# Patient Record
Sex: MALE | Race: WHITE | NOT HISPANIC OR LATINO | Employment: FULL TIME | URBAN - METROPOLITAN AREA
[De-identification: names, ages, dates, MRNs, and addresses within clinical notes are randomized per-mention and may not be internally consistent; named-entity substitution may affect disease eponyms.]

---

## 2020-12-24 PROCEDURE — U0003 INFECTIOUS AGENT DETECTION BY NUCLEIC ACID (DNA OR RNA); SEVERE ACUTE RESPIRATORY SYNDROME CORONAVIRUS 2 (SARS-COV-2) (CORONAVIRUS DISEASE [COVID-19]), AMPLIFIED PROBE TECHNIQUE, MAKING USE OF HIGH THROUGHPUT TECHNOLOGIES AS DESCRIBED BY CMS-2020-01-R: HCPCS | Performed by: FAMILY MEDICINE

## 2021-06-02 PROCEDURE — U0003 INFECTIOUS AGENT DETECTION BY NUCLEIC ACID (DNA OR RNA); SEVERE ACUTE RESPIRATORY SYNDROME CORONAVIRUS 2 (SARS-COV-2) (CORONAVIRUS DISEASE [COVID-19]), AMPLIFIED PROBE TECHNIQUE, MAKING USE OF HIGH THROUGHPUT TECHNOLOGIES AS DESCRIBED BY CMS-2020-01-R: HCPCS

## 2023-05-08 ENCOUNTER — HOSPITAL ENCOUNTER (EMERGENCY)
Facility: HOSPITAL | Age: 33
Discharge: HOME | End: 2023-05-08
Attending: EMERGENCY MEDICINE | Admitting: EMERGENCY MEDICINE
Payer: COMMERCIAL

## 2023-05-08 ENCOUNTER — APPOINTMENT (OUTPATIENT)
Dept: CT IMAGING | Facility: HOSPITAL | Age: 33
End: 2023-05-08
Payer: COMMERCIAL

## 2023-05-08 VITALS
HEIGHT: 70 IN | BODY MASS INDEX: 26.54 KG/M2 | HEART RATE: 67 BPM | SYSTOLIC BLOOD PRESSURE: 108 MMHG | DIASTOLIC BLOOD PRESSURE: 72 MMHG | OXYGEN SATURATION: 96 % | RESPIRATION RATE: 20 BRPM | TEMPERATURE: 98.4 F | WEIGHT: 185.41 LBS

## 2023-05-08 DIAGNOSIS — R74.8 ELEVATED LIVER ENZYMES: ICD-10-CM

## 2023-05-08 DIAGNOSIS — R11.2 NAUSEA AND VOMITING, UNSPECIFIED VOMITING TYPE: ICD-10-CM

## 2023-05-08 DIAGNOSIS — R10.84 GENERALIZED ABDOMINAL PAIN: Primary | ICD-10-CM

## 2023-05-08 LAB
ALBUMIN SERPL-MCNC: 4.6 G/DL (ref 3.5–5.2)
ALBUMIN/GLOB SERPL: 1.9 G/DL
ALP SERPL-CCNC: 112 U/L (ref 39–117)
ALT SERPL W P-5'-P-CCNC: 253 U/L (ref 1–41)
AMPHET+METHAMPHET UR QL: NEGATIVE
ANION GAP SERPL CALCULATED.3IONS-SCNC: 11 MMOL/L (ref 5–15)
AST SERPL-CCNC: 147 U/L (ref 1–40)
BACTERIA UR QL AUTO: ABNORMAL /HPF
BARBITURATES UR QL SCN: NEGATIVE
BASOPHILS # BLD AUTO: 0 10*3/MM3 (ref 0–0.2)
BASOPHILS NFR BLD AUTO: 0.2 % (ref 0–1.5)
BENZODIAZ UR QL SCN: NEGATIVE
BILIRUB SERPL-MCNC: 0.9 MG/DL (ref 0–1.2)
BILIRUB UR QL STRIP: NEGATIVE
BUN SERPL-MCNC: 14 MG/DL (ref 6–20)
BUN/CREAT SERPL: 15.9 (ref 7–25)
CALCIUM SPEC-SCNC: 9.5 MG/DL (ref 8.6–10.5)
CANNABINOIDS SERPL QL: NEGATIVE
CHLORIDE SERPL-SCNC: 101 MMOL/L (ref 98–107)
CLARITY UR: CLEAR
CO2 SERPL-SCNC: 28 MMOL/L (ref 22–29)
COCAINE UR QL: NEGATIVE
COLOR UR: YELLOW
CREAT SERPL-MCNC: 0.88 MG/DL (ref 0.76–1.27)
DEPRECATED RDW RBC AUTO: 45.9 FL (ref 37–54)
EGFRCR SERPLBLD CKD-EPI 2021: 117.9 ML/MIN/1.73
EOSINOPHIL # BLD AUTO: 0.1 10*3/MM3 (ref 0–0.4)
EOSINOPHIL NFR BLD AUTO: 1.4 % (ref 0.3–6.2)
ERYTHROCYTE [DISTWIDTH] IN BLOOD BY AUTOMATED COUNT: 13.8 % (ref 12.3–15.4)
GLOBULIN UR ELPH-MCNC: 2.4 GM/DL
GLUCOSE SERPL-MCNC: 94 MG/DL (ref 65–99)
GLUCOSE UR STRIP-MCNC: NEGATIVE MG/DL
HAV IGM SERPL QL IA: ABNORMAL
HBV CORE IGM SERPL QL IA: ABNORMAL
HBV SURFACE AG SERPL QL IA: ABNORMAL
HCT VFR BLD AUTO: 47.8 % (ref 37.5–51)
HCV AB SER DONR QL: REACTIVE
HGB BLD-MCNC: 16.3 G/DL (ref 13–17.7)
HGB UR QL STRIP.AUTO: NEGATIVE
HYALINE CASTS UR QL AUTO: ABNORMAL /LPF
KETONES UR QL STRIP: ABNORMAL
LEUKOCYTE ESTERASE UR QL STRIP.AUTO: ABNORMAL
LIPASE SERPL-CCNC: 30 U/L (ref 13–60)
LYMPHOCYTES # BLD AUTO: 1.6 10*3/MM3 (ref 0.7–3.1)
LYMPHOCYTES NFR BLD AUTO: 25.9 % (ref 19.6–45.3)
MCH RBC QN AUTO: 33.1 PG (ref 26.6–33)
MCHC RBC AUTO-ENTMCNC: 34.1 G/DL (ref 31.5–35.7)
MCV RBC AUTO: 97 FL (ref 79–97)
METHADONE UR QL SCN: NEGATIVE
MONOCYTES # BLD AUTO: 0.6 10*3/MM3 (ref 0.1–0.9)
MONOCYTES NFR BLD AUTO: 9.1 % (ref 5–12)
NEUTROPHILS NFR BLD AUTO: 3.9 10*3/MM3 (ref 1.7–7)
NEUTROPHILS NFR BLD AUTO: 63.4 % (ref 42.7–76)
NITRITE UR QL STRIP: NEGATIVE
NRBC BLD AUTO-RTO: 0.1 /100 WBC (ref 0–0.2)
OPIATES UR QL: NEGATIVE
OXYCODONE UR QL SCN: NEGATIVE
PH UR STRIP.AUTO: 8 [PH] (ref 5–8)
PLATELET # BLD AUTO: 258 10*3/MM3 (ref 140–450)
PMV BLD AUTO: 6.8 FL (ref 6–12)
POTASSIUM SERPL-SCNC: 4.4 MMOL/L (ref 3.5–5.2)
PROT SERPL-MCNC: 7 G/DL (ref 6–8.5)
PROT UR QL STRIP: ABNORMAL
RBC # BLD AUTO: 4.93 10*6/MM3 (ref 4.14–5.8)
RBC # UR STRIP: ABNORMAL /HPF
REF LAB TEST METHOD: ABNORMAL
SODIUM SERPL-SCNC: 140 MMOL/L (ref 136–145)
SP GR UR STRIP: 1.02 (ref 1–1.03)
SQUAMOUS #/AREA URNS HPF: ABNORMAL /HPF
UNIDENT CRYS URNS QL MICRO: ABNORMAL /HPF
UROBILINOGEN UR QL STRIP: ABNORMAL
WBC # UR STRIP: ABNORMAL /HPF
WBC NRBC COR # BLD: 6.1 10*3/MM3 (ref 3.4–10.8)

## 2023-05-08 PROCEDURE — 80074 ACUTE HEPATITIS PANEL: CPT | Performed by: NURSE PRACTITIONER

## 2023-05-08 PROCEDURE — 80307 DRUG TEST PRSMV CHEM ANLYZR: CPT | Performed by: NURSE PRACTITIONER

## 2023-05-08 PROCEDURE — 83690 ASSAY OF LIPASE: CPT | Performed by: NURSE PRACTITIONER

## 2023-05-08 PROCEDURE — 85025 COMPLETE CBC W/AUTO DIFF WBC: CPT | Performed by: NURSE PRACTITIONER

## 2023-05-08 PROCEDURE — 80053 COMPREHEN METABOLIC PANEL: CPT | Performed by: NURSE PRACTITIONER

## 2023-05-08 PROCEDURE — 81001 URINALYSIS AUTO W/SCOPE: CPT | Performed by: NURSE PRACTITIONER

## 2023-05-08 PROCEDURE — 96374 THER/PROPH/DIAG INJ IV PUSH: CPT

## 2023-05-08 PROCEDURE — 99283 EMERGENCY DEPT VISIT LOW MDM: CPT

## 2023-05-08 PROCEDURE — 96375 TX/PRO/DX INJ NEW DRUG ADDON: CPT

## 2023-05-08 PROCEDURE — 74176 CT ABD & PELVIS W/O CONTRAST: CPT

## 2023-05-08 PROCEDURE — 25010000002 ONDANSETRON PER 1 MG: Performed by: NURSE PRACTITIONER

## 2023-05-08 RX ORDER — PANTOPRAZOLE SODIUM 40 MG/1
40 TABLET, DELAYED RELEASE ORAL DAILY
Qty: 15 TABLET | Refills: 0 | Status: SHIPPED | OUTPATIENT
Start: 2023-05-08

## 2023-05-08 RX ORDER — PANTOPRAZOLE SODIUM 40 MG/10ML
80 INJECTION, POWDER, LYOPHILIZED, FOR SOLUTION INTRAVENOUS ONCE
Status: COMPLETED | OUTPATIENT
Start: 2023-05-08 | End: 2023-05-08

## 2023-05-08 RX ORDER — ONDANSETRON 4 MG/1
4 TABLET, ORALLY DISINTEGRATING ORAL EVERY 8 HOURS PRN
Qty: 8 TABLET | Refills: 0 | Status: SHIPPED | OUTPATIENT
Start: 2023-05-08

## 2023-05-08 RX ORDER — SODIUM CHLORIDE 0.9 % (FLUSH) 0.9 %
10 SYRINGE (ML) INJECTION AS NEEDED
Status: DISCONTINUED | OUTPATIENT
Start: 2023-05-08 | End: 2023-05-08 | Stop reason: HOSPADM

## 2023-05-08 RX ORDER — ONDANSETRON 2 MG/ML
4 INJECTION INTRAMUSCULAR; INTRAVENOUS ONCE
Status: COMPLETED | OUTPATIENT
Start: 2023-05-08 | End: 2023-05-08

## 2023-05-08 RX ADMIN — PANTOPRAZOLE SODIUM 80 MG: 40 INJECTION, POWDER, LYOPHILIZED, FOR SOLUTION INTRAVENOUS at 14:55

## 2023-05-08 RX ADMIN — SODIUM CHLORIDE 1000 ML: 9 INJECTION, SOLUTION INTRAVENOUS at 11:13

## 2023-05-08 RX ADMIN — ONDANSETRON 4 MG: 2 INJECTION INTRAMUSCULAR; INTRAVENOUS at 11:13

## 2023-05-08 NOTE — DISCHARGE INSTRUCTIONS
Take medications as prescribed.  Drink plenty of fluids.  Close follow-up with gastroenterology.  Call them today or tomorrow for appointment.  Return for new or worsening symptoms.

## 2023-05-08 NOTE — ED PROVIDER NOTES
"Subjective   History of Present Illness  Patient is a 32-year-old white male with no significant medical history who presents with complaints of abdominal pain nausea vomiting.  He states he has had central abdominal discomfort daily for \"a while now.\"  He states at least for months maybe close to a year he has had this.  He states also intermittently he has nausea with vomiting.  He reports today he feels bad presents to the ED for evaluation.  He does admit to drinking approximately 1/5 of vodka daily, last drink was around midnight last night.  He denies any hematemesis.  He denies any diarrhea constipation black or bloody stools.  Denies change in urination.  He denies any illicit drug use currently but states that he did used to use IV drugs.  States he has been clean for the last year or 2.  He denies any known ill contacts or recent travel.        Review of Systems   Constitutional: Negative for chills and fever.   Respiratory: Negative for shortness of breath.    Cardiovascular: Negative for chest pain.   Gastrointestinal: Positive for abdominal pain, nausea and vomiting. Negative for blood in stool, constipation and diarrhea.   Genitourinary: Negative for difficulty urinating, dysuria, frequency and urgency.       No past medical history on file.    No Known Allergies    No past surgical history on file.    No family history on file.    Social History     Socioeconomic History   • Marital status:    Tobacco Use   • Smoking status: Every Day     Packs/day: 1.50     Years: 15.00     Pack years: 22.50     Types: Cigarettes   • Smokeless tobacco: Never   Vaping Use   • Vaping Use: Never used   Substance and Sexual Activity   • Alcohol use: Not Currently     Comment: SOCIALLY            Objective   Physical Exam  Vital signs and triage nurse note reviewed.  Constitutional: Awake, alert; well-developed and well-nourished. No acute distress is noted.  HEENT: Normocephalic, atraumatic; pupils are PERRL with " intact EOM; oropharynx is pink and moist without exudate or erythema.  No drooling or pooling of oral secretions.  Neck: Supple, full range of motion without pain; no cervical lymphadenopathy. Normal phonation.  Cardiovascular: Regular rate and rhythm, normal S1-S2.  No murmur noted.  Pulmonary: Respiratory effort regular nonlabored, breath sounds clear to auscultation all fields.  Abdomen: Soft, nontender, nondistended with normoactive bowel sounds; no rebound or guarding.  Musculoskeletal: Independent range of motion of all extremities with no palpable tenderness or edema.  Neuro: Alert oriented x3, speech is clear and appropriate, GCS 15.    Skin: Flesh tone, warm, dry, intact; no erythematous or petechial rash or lesion.      Procedures           ED Course      Labs Reviewed   COMPREHENSIVE METABOLIC PANEL - Abnormal; Notable for the following components:       Result Value    ALT (SGPT) 253 (*)     AST (SGOT) 147 (*)     All other components within normal limits    Narrative:     GFR Normal >60  Chronic Kidney Disease <60  Kidney Failure <15     URINALYSIS W/ MICROSCOPIC IF INDICATED (NO CULTURE) - Abnormal; Notable for the following components:    Ketones, UA Trace (*)     Protein, UA Trace (*)     Leuk Esterase, UA Trace (*)     All other components within normal limits   CBC WITH AUTO DIFFERENTIAL - Abnormal; Notable for the following components:    MCH 33.1 (*)     All other components within normal limits   HEPATITIS PANEL, ACUTE - Abnormal; Notable for the following components:    Hepatitis C Ab Reactive (*)     All other components within normal limits    Narrative:     Results may be falsely decreased if patient taking Biotin.    URINALYSIS, MICROSCOPIC ONLY - Abnormal; Notable for the following components:    WBC, UA 0-2 (*)     All other components within normal limits   LIPASE - Normal   URINE DRUG SCREEN - Normal    Narrative:     Negative Thresholds Per Drugs Screened:    Amphetamines                  500 ng/ml  Barbiturates                 200 ng/ml  Benzodiazepines              100 ng/ml  Cocaine                      300 ng/ml  Methadone                    300 ng/ml  Opiates                      300 ng/ml  Oxycodone                    100 ng/ml  THC                           50 ng/ml    The Normal Value for all drugs tested is negative. This report includes final unconfirmed screening results to be used for medical treatment purposes only. Unconfirmed results must not be used for non-medical purposes such as employment or legal testing. Clinical consideration should be applied to any drug of abuse test, particularly when unconfirmed results are used.          All urine drugs of abuse requests without chain of custody are for medical screening purposes only.  False positives are possible.     CBC AND DIFFERENTIAL    Narrative:     The following orders were created for panel order CBC & Differential.  Procedure                               Abnormality         Status                     ---------                               -----------         ------                     CBC Auto Differential[[id number]]        Abnormal            Final result                 Please view results for these tests on the individual orders.     CT Abdomen Pelvis Without Contrast    Result Date: 5/8/2023  Minimal perinephric stranding noted without acute abnormality of the kidneys otherwise noted on limited noncontrast imaging. Evaluation of pyelonephritis on noncontrast imaging is limited. If there is clinical concern follow-up could be obtained as clinically indicated No acute intra-abdominal or intrapelvic abnormality otherwise noted Electronically Signed: David Fontenot  5/8/2023 1:40 PM EDT  Workstation ID: [id number]    Medications   sodium chloride 0.9 % flush 10 mL (has no administration in time range)   sodium chloride 0.9 % bolus 1,000 mL (0 mL Intravenous Stopped 5/8/23 1213)   ondansetron (ZOFRAN) injection 4 mg (4 mg  Intravenous Given 5/8/23 1113)   pantoprazole (PROTONIX) injection 80 mg (80 mg Intravenous Given 5/8/23 6729)                                          Medical Decision Making  Patient presents today from home with complaints of abdominal pain nausea vomiting.  Abdominal pain for the last year or so, nausea and vomiting intermittently as well.  Admits to drinking 1/5 of vodka daily, last drink around midnight last night.    Patient had above exam evaluation.  IV was established.  Labs and CT were obtained.  He is given a liter of IV fluids as well as Zofran for nausea.  He is given a dose of IV Protonix.    Work-up: CBC is grossly unremarkable.  Metabolic panel is significant for , .  Normal lipase.  Urinalysis shows trace ketones, no blood or evidence of infection.  Urine drug screen is negative.  Acute hepatitis panel is hepatitis C antibiotic reactive.  CT abdomen pelvis reviewed by me interpreted by the radiologist shows minimal perinephric stranding noted without acute abnormality of the kidneys otherwise.  No other acute abnormality noted.    On reexamination patient is resting quietly and in no distress.  He has no new complaints.  He said no vomiting since arrival to the ED.  He is hemodynamically stable.    Patient and findings were discussed with the gastroenterologist nurse practitioner, Adelia, who feels patient is stable for discharge home with outpatient follow-up.    Diagnosis and treatment plan discussed with patient.  Patient agreeable to plan.   I discussed findings with patient who voices understanding of discharge instructions, signs and symptoms requiring return to ED; discharged improved and in stable condition with follow up for re-evaluation.  Prescriptions for Protonix and Zofran.    Elevated liver enzymes: complicated acute illness or injury  Generalized abdominal pain: complicated acute illness or injury  Nausea and vomiting, unspecified vomiting type: complicated acute  illness or injury  Amount and/or Complexity of Data Reviewed  Labs: ordered.  Radiology: ordered.      Risk  Prescription drug management.          Final diagnoses:   Generalized abdominal pain   Nausea and vomiting, unspecified vomiting type   Elevated liver enzymes       ED Disposition  ED Disposition     ED Disposition   Discharge    Condition   Stable    Comment   --             [facility]  [address] [facility] [address] [phone]  Schedule an appointment as soon as possible for a visit            Medication List      New Prescriptions    ondansetron ODT 4 MG disintegrating tablet  Commonly known as: ZOFRAN-ODT  Place 1 tablet on the tongue Every 8 (Eight) Hours As Needed for Nausea.     pantoprazole 40 MG EC tablet  Commonly known as: PROTONIX  Take 1 tablet by mouth Daily.           Where to Get Your Medications      These medications were sent to [facility] - [place], [place] -[id number] - [place], [place] - [address] [place] [address] - [phone]  - [phone] [address] [place] [address]    Phone: [phone]   · ondansetron ODT 4 MG disintegrating tablet  · pantoprazole 40 MG EC tablet          Mahsa Mac, APRN  05/08/23 1888

## 2024-07-30 ENCOUNTER — HOSPITAL ENCOUNTER (INPATIENT)
Facility: HOSPITAL | Age: 34
LOS: 1 days | Discharge: HOME | End: 2024-08-02
Attending: INTERNAL MEDICINE | Admitting: HOSPITALIST
Payer: COMMERCIAL

## 2024-07-30 ENCOUNTER — APPOINTMENT (OUTPATIENT)
Dept: CT IMAGING | Facility: HOSPITAL | Age: 34
End: 2024-07-30
Payer: COMMERCIAL

## 2024-07-30 DIAGNOSIS — R10.10 UPPER ABDOMINAL PAIN: Primary | ICD-10-CM

## 2024-07-30 DIAGNOSIS — R11.2 NAUSEA AND VOMITING, UNSPECIFIED VOMITING TYPE: ICD-10-CM

## 2024-07-30 DIAGNOSIS — F10.10 ALCOHOL ABUSE: ICD-10-CM

## 2024-07-30 DIAGNOSIS — R79.89 ELEVATED LFTS: ICD-10-CM

## 2024-07-30 PROBLEM — Z72.0 TOBACCO USE: Status: ACTIVE | Noted: 2024-07-30

## 2024-07-30 PROBLEM — R10.9 ABDOMINAL PAIN: Status: ACTIVE | Noted: 2024-07-30

## 2024-07-30 PROBLEM — E80.6 HYPERBILIRUBINEMIA: Status: ACTIVE | Noted: 2024-07-30

## 2024-07-30 PROBLEM — F10.930 ALCOHOL WITHDRAWAL SYNDROME WITHOUT COMPLICATION: Status: ACTIVE | Noted: 2024-07-30

## 2024-07-30 LAB
ALBUMIN SERPL-MCNC: 4.8 G/DL (ref 3.5–5.2)
ALBUMIN/GLOB SERPL: 1.8 G/DL
ALP SERPL-CCNC: 143 U/L (ref 39–117)
ALT SERPL W P-5'-P-CCNC: 382 U/L (ref 1–41)
AMMONIA BLD-SCNC: 26 UMOL/L (ref 16–60)
ANION GAP SERPL CALCULATED.3IONS-SCNC: 33.4 MMOL/L (ref 5–15)
AST SERPL-CCNC: 1175 U/L (ref 1–40)
BACTERIA UR QL AUTO: NORMAL /HPF
BASOPHILS # BLD AUTO: 0.04 10*3/MM3 (ref 0–0.2)
BASOPHILS NFR BLD AUTO: 0.4 % (ref 0–1.5)
BILIRUB SERPL-MCNC: 2.6 MG/DL (ref 0–1.2)
BILIRUB UR QL STRIP: NEGATIVE
BUN SERPL-MCNC: 11 MG/DL (ref 6–20)
BUN/CREAT SERPL: 11.7 (ref 7–25)
CALCIUM SPEC-SCNC: 9.3 MG/DL (ref 8.6–10.5)
CHLORIDE SERPL-SCNC: 91 MMOL/L (ref 98–107)
CLARITY UR: ABNORMAL
CO2 SERPL-SCNC: 12.6 MMOL/L (ref 22–29)
COLOR UR: YELLOW
CREAT SERPL-MCNC: 0.94 MG/DL (ref 0.76–1.27)
DEPRECATED RDW RBC AUTO: 42.4 FL (ref 37–54)
EGFRCR SERPLBLD CKD-EPI 2021: 110.5 ML/MIN/1.73
EOSINOPHIL # BLD AUTO: 0.03 10*3/MM3 (ref 0–0.4)
EOSINOPHIL NFR BLD AUTO: 0.3 % (ref 0.3–6.2)
ERYTHROCYTE [DISTWIDTH] IN BLOOD BY AUTOMATED COUNT: 11.5 % (ref 12.3–15.4)
ETHANOL UR QL: 0.21 %
GLOBULIN UR ELPH-MCNC: 2.6 GM/DL
GLUCOSE BLDC GLUCOMTR-MCNC: 69 MG/DL (ref 70–105)
GLUCOSE SERPL-MCNC: 57 MG/DL (ref 65–99)
GLUCOSE UR STRIP-MCNC: NEGATIVE MG/DL
HCT VFR BLD AUTO: 50.3 % (ref 37.5–51)
HGB BLD-MCNC: 17.2 G/DL (ref 13–17.7)
HGB UR QL STRIP.AUTO: NEGATIVE
HYALINE CASTS UR QL AUTO: NORMAL /LPF
IMM GRANULOCYTES # BLD AUTO: 0.05 10*3/MM3 (ref 0–0.05)
IMM GRANULOCYTES NFR BLD AUTO: 0.5 % (ref 0–0.5)
KETONES UR QL STRIP: ABNORMAL
LEUKOCYTE ESTERASE UR QL STRIP.AUTO: NEGATIVE
LIPASE SERPL-CCNC: 41 U/L (ref 13–60)
LYMPHOCYTES # BLD AUTO: 0.64 10*3/MM3 (ref 0.7–3.1)
LYMPHOCYTES NFR BLD AUTO: 6.2 % (ref 19.6–45.3)
MAGNESIUM SERPL-MCNC: 1.7 MG/DL (ref 1.6–2.6)
MCH RBC QN AUTO: 34.5 PG (ref 26.6–33)
MCHC RBC AUTO-ENTMCNC: 34.2 G/DL (ref 31.5–35.7)
MCV RBC AUTO: 100.8 FL (ref 79–97)
MONOCYTES # BLD AUTO: 0.76 10*3/MM3 (ref 0.1–0.9)
MONOCYTES NFR BLD AUTO: 7.4 % (ref 5–12)
NEUTROPHILS NFR BLD AUTO: 8.73 10*3/MM3 (ref 1.7–7)
NEUTROPHILS NFR BLD AUTO: 85.2 % (ref 42.7–76)
NITRITE UR QL STRIP: NEGATIVE
NRBC BLD AUTO-RTO: 0 /100 WBC (ref 0–0.2)
PH UR STRIP.AUTO: <=5 [PH] (ref 5–8)
PLATELET # BLD AUTO: 203 10*3/MM3 (ref 140–450)
PMV BLD AUTO: 9.1 FL (ref 6–12)
POTASSIUM SERPL-SCNC: 4.5 MMOL/L (ref 3.5–5.2)
PROT SERPL-MCNC: 7.4 G/DL (ref 6–8.5)
PROT UR QL STRIP: ABNORMAL
RBC # BLD AUTO: 4.99 10*6/MM3 (ref 4.14–5.8)
RBC # UR STRIP: NORMAL /HPF
REF LAB TEST METHOD: NORMAL
SODIUM SERPL-SCNC: 137 MMOL/L (ref 136–145)
SP GR UR STRIP: 1.02 (ref 1–1.03)
SQUAMOUS #/AREA URNS HPF: NORMAL /HPF
UROBILINOGEN UR QL STRIP: ABNORMAL
WBC # UR STRIP: NORMAL /HPF
WBC NRBC COR # BLD AUTO: 10.25 10*3/MM3 (ref 3.4–10.8)

## 2024-07-30 PROCEDURE — 25010000002 THIAMINE PER 100 MG: Performed by: NURSE PRACTITIONER

## 2024-07-30 PROCEDURE — 83540 ASSAY OF IRON: CPT | Performed by: NURSE PRACTITIONER

## 2024-07-30 PROCEDURE — G0378 HOSPITAL OBSERVATION PER HR: HCPCS

## 2024-07-30 PROCEDURE — 83735 ASSAY OF MAGNESIUM: CPT | Performed by: NURSE PRACTITIONER

## 2024-07-30 PROCEDURE — 25010000002 LORAZEPAM PER 2 MG: Performed by: NURSE PRACTITIONER

## 2024-07-30 PROCEDURE — 25510000001 IOPAMIDOL PER 1 ML: Performed by: NURSE PRACTITIONER

## 2024-07-30 PROCEDURE — 81001 URINALYSIS AUTO W/SCOPE: CPT | Performed by: NURSE PRACTITIONER

## 2024-07-30 PROCEDURE — 99285 EMERGENCY DEPT VISIT HI MDM: CPT

## 2024-07-30 PROCEDURE — 74177 CT ABD & PELVIS W/CONTRAST: CPT

## 2024-07-30 PROCEDURE — 36415 COLL VENOUS BLD VENIPUNCTURE: CPT

## 2024-07-30 PROCEDURE — 25810000003 SODIUM CHLORIDE 0.9 % SOLUTION: Performed by: NURSE PRACTITIONER

## 2024-07-30 PROCEDURE — 80053 COMPREHEN METABOLIC PANEL: CPT | Performed by: NURSE PRACTITIONER

## 2024-07-30 PROCEDURE — 83690 ASSAY OF LIPASE: CPT | Performed by: NURSE PRACTITIONER

## 2024-07-30 PROCEDURE — 82103 ALPHA-1-ANTITRYPSIN TOTAL: CPT | Performed by: NURSE PRACTITIONER

## 2024-07-30 PROCEDURE — 82140 ASSAY OF AMMONIA: CPT | Performed by: NURSE PRACTITIONER

## 2024-07-30 PROCEDURE — 85025 COMPLETE CBC W/AUTO DIFF WBC: CPT | Performed by: NURSE PRACTITIONER

## 2024-07-30 PROCEDURE — 82728 ASSAY OF FERRITIN: CPT | Performed by: NURSE PRACTITIONER

## 2024-07-30 PROCEDURE — 82948 REAGENT STRIP/BLOOD GLUCOSE: CPT

## 2024-07-30 PROCEDURE — 93005 ELECTROCARDIOGRAM TRACING: CPT | Performed by: EMERGENCY MEDICINE

## 2024-07-30 PROCEDURE — 82077 ASSAY SPEC XCP UR&BREATH IA: CPT | Performed by: NURSE PRACTITIONER

## 2024-07-30 PROCEDURE — 25010000002 ONDANSETRON PER 1 MG: Performed by: NURSE PRACTITIONER

## 2024-07-30 PROCEDURE — 25010000002 KETOROLAC TROMETHAMINE PER 15 MG: Performed by: NURSE PRACTITIONER

## 2024-07-30 PROCEDURE — 82390 ASSAY OF CERULOPLASMIN: CPT | Performed by: NURSE PRACTITIONER

## 2024-07-30 RX ORDER — THIAMINE HYDROCHLORIDE 100 MG/ML
200 INJECTION, SOLUTION INTRAMUSCULAR; INTRAVENOUS EVERY 8 HOURS SCHEDULED
Status: DISCONTINUED | OUTPATIENT
Start: 2024-07-31 | End: 2024-08-02 | Stop reason: HOSPADM

## 2024-07-30 RX ORDER — ONDANSETRON 2 MG/ML
4 INJECTION INTRAMUSCULAR; INTRAVENOUS EVERY 6 HOURS PRN
Status: DISCONTINUED | OUTPATIENT
Start: 2024-07-30 | End: 2024-08-02 | Stop reason: HOSPADM

## 2024-07-30 RX ORDER — LABETALOL HYDROCHLORIDE 5 MG/ML
10 INJECTION, SOLUTION INTRAVENOUS EVERY 6 HOURS PRN
Status: DISCONTINUED | OUTPATIENT
Start: 2024-07-30 | End: 2024-08-02 | Stop reason: HOSPADM

## 2024-07-30 RX ORDER — KETOROLAC TROMETHAMINE 30 MG/ML
15 INJECTION, SOLUTION INTRAMUSCULAR; INTRAVENOUS ONCE
Status: COMPLETED | OUTPATIENT
Start: 2024-07-30 | End: 2024-07-30

## 2024-07-30 RX ORDER — FOLIC ACID 1 MG/1
1 TABLET ORAL DAILY
Status: DISCONTINUED | OUTPATIENT
Start: 2024-07-31 | End: 2024-08-02 | Stop reason: HOSPADM

## 2024-07-30 RX ORDER — LORAZEPAM 2 MG/ML
1 INJECTION INTRAMUSCULAR ONCE
Status: COMPLETED | OUTPATIENT
Start: 2024-07-30 | End: 2024-07-30

## 2024-07-30 RX ORDER — SODIUM CHLORIDE 0.9 % (FLUSH) 0.9 %
10 SYRINGE (ML) INJECTION AS NEEDED
Status: DISCONTINUED | OUTPATIENT
Start: 2024-07-30 | End: 2024-08-02 | Stop reason: HOSPADM

## 2024-07-30 RX ORDER — NICOTINE 21 MG/24HR
1 PATCH, TRANSDERMAL 24 HOURS TRANSDERMAL
Status: DISCONTINUED | OUTPATIENT
Start: 2024-07-31 | End: 2024-08-02 | Stop reason: HOSPADM

## 2024-07-30 RX ORDER — LORAZEPAM 1 MG/1
2 TABLET ORAL
Status: DISCONTINUED | OUTPATIENT
Start: 2024-07-30 | End: 2024-08-02 | Stop reason: HOSPADM

## 2024-07-30 RX ORDER — THIAMINE HYDROCHLORIDE 100 MG/ML
200 INJECTION, SOLUTION INTRAMUSCULAR; INTRAVENOUS ONCE
Status: COMPLETED | OUTPATIENT
Start: 2024-07-30 | End: 2024-07-30

## 2024-07-30 RX ORDER — ONDANSETRON 2 MG/ML
4 INJECTION INTRAMUSCULAR; INTRAVENOUS ONCE
Status: COMPLETED | OUTPATIENT
Start: 2024-07-30 | End: 2024-07-30

## 2024-07-30 RX ORDER — SODIUM CHLORIDE 9 MG/ML
1000 INJECTION, SOLUTION INTRAVENOUS ONCE
Status: COMPLETED | OUTPATIENT
Start: 2024-07-30 | End: 2024-07-30

## 2024-07-30 RX ORDER — LORAZEPAM 2 MG/ML
1 INJECTION INTRAMUSCULAR
Status: DISCONTINUED | OUTPATIENT
Start: 2024-07-30 | End: 2024-08-02 | Stop reason: HOSPADM

## 2024-07-30 RX ORDER — ONDANSETRON 4 MG/1
4 TABLET, ORALLY DISINTEGRATING ORAL EVERY 6 HOURS PRN
Status: DISCONTINUED | OUTPATIENT
Start: 2024-07-30 | End: 2024-08-02 | Stop reason: HOSPADM

## 2024-07-30 RX ORDER — PANTOPRAZOLE SODIUM 40 MG/1
40 TABLET, DELAYED RELEASE ORAL
Status: DISCONTINUED | OUTPATIENT
Start: 2024-07-31 | End: 2024-08-02 | Stop reason: HOSPADM

## 2024-07-30 RX ORDER — FAMOTIDINE 10 MG/ML
20 INJECTION, SOLUTION INTRAVENOUS ONCE
Status: COMPLETED | OUTPATIENT
Start: 2024-07-30 | End: 2024-07-30

## 2024-07-30 RX ORDER — LORAZEPAM 2 MG/ML
2 INJECTION INTRAMUSCULAR
Status: DISCONTINUED | OUTPATIENT
Start: 2024-07-30 | End: 2024-08-02 | Stop reason: HOSPADM

## 2024-07-30 RX ORDER — SODIUM CHLORIDE 9 MG/ML
100 INJECTION, SOLUTION INTRAVENOUS CONTINUOUS
Status: DISCONTINUED | OUTPATIENT
Start: 2024-07-30 | End: 2024-08-02 | Stop reason: HOSPADM

## 2024-07-30 RX ORDER — MULTIPLE VITAMINS W/ MINERALS TAB 9MG-400MCG
1 TAB ORAL DAILY
Status: DISCONTINUED | OUTPATIENT
Start: 2024-07-30 | End: 2024-08-02 | Stop reason: HOSPADM

## 2024-07-30 RX ORDER — LORAZEPAM 1 MG/1
1 TABLET ORAL
Status: DISCONTINUED | OUTPATIENT
Start: 2024-07-30 | End: 2024-08-02 | Stop reason: HOSPADM

## 2024-07-30 RX ADMIN — FOLIC ACID 1 MG: 5 INJECTION, SOLUTION INTRAMUSCULAR; INTRAVENOUS; SUBCUTANEOUS at 16:15

## 2024-07-30 RX ADMIN — KETOROLAC TROMETHAMINE 15 MG: 30 INJECTION, SOLUTION INTRAMUSCULAR at 21:03

## 2024-07-30 RX ADMIN — LORAZEPAM 2 MG: 2 INJECTION INTRAMUSCULAR; INTRAVENOUS at 20:58

## 2024-07-30 RX ADMIN — FAMOTIDINE 20 MG: 10 INJECTION INTRAVENOUS at 16:17

## 2024-07-30 RX ADMIN — ONDANSETRON 4 MG: 2 INJECTION INTRAMUSCULAR; INTRAVENOUS at 16:17

## 2024-07-30 RX ADMIN — Medication 1 TABLET: at 21:03

## 2024-07-30 RX ADMIN — IOPAMIDOL 100 ML: 755 INJECTION, SOLUTION INTRAVENOUS at 17:20

## 2024-07-30 RX ADMIN — LORAZEPAM 1 MG: 2 INJECTION INTRAMUSCULAR; INTRAVENOUS at 17:40

## 2024-07-30 RX ADMIN — SODIUM CHLORIDE 1000 ML: 9 INJECTION, SOLUTION INTRAVENOUS at 16:16

## 2024-07-30 RX ADMIN — SODIUM CHLORIDE 100 ML/HR: 9 INJECTION, SOLUTION INTRAVENOUS at 21:28

## 2024-07-30 RX ADMIN — THIAMINE HYDROCHLORIDE 200 MG: 100 INJECTION, SOLUTION INTRAMUSCULAR; INTRAVENOUS at 16:17

## 2024-07-30 NOTE — Clinical Note
Level of Care: Telemetry [5]   Admitting Physician: CECILY MARTIN [[id number]]   Attending Physician: CECILY MARTIN [[id number]]

## 2024-07-30 NOTE — ED PROVIDER NOTES
Subjective   History of Present Illness  Patient is a 33-year-old white male who presents with complaints of upper abdominal pain nausea vomiting.  He states he is a daily drinker, drinks about 1/5 daily.  His last drink was this morning.  States he is never had withdrawal seizures before but he does occasionally get shaky and anxious if he goes longer than 2 or 3 hours without alcohol.  States he feels more drowsy than usual today.  He had some intermittent episodes of abdominal pain and vomiting for the last several months.  He was seen here in the ED a about a year ago was told he had elevated liver enzymes and hepatitis but never followed up with gastroenterology.  States his pain is worse today and different than usual and is associated with some vomiting.  No bloody emesis.  Denies any chest pain or shortness of breath.  He reports prior history of IV drug use but has been clean for several years.  He is inquiring about alcohol detox today as well.      Review of Systems   Constitutional:  Negative for fever.   Respiratory:  Negative for shortness of breath.    Cardiovascular:  Negative for chest pain.   Gastrointestinal:  Positive for abdominal pain, nausea and vomiting. Negative for blood in stool and diarrhea.   Genitourinary:  Negative for dysuria.       No past medical history on file.    No Known Allergies    No past surgical history on file.    No family history on file.    Social History     Socioeconomic History    Marital status:    Tobacco Use    Smoking status: Every Day     Current packs/day: 1.50     Average packs/day: 1.5 packs/day for 15.0 years (22.5 ttl pk-yrs)     Types: Cigarettes    Smokeless tobacco: Never   Vaping Use    Vaping status: Never Used   Substance and Sexual Activity    Alcohol use: Not Currently     Comment: SOCIALLY            Objective   Physical Exam  Vital signs and triage nurse note reviewed.  Constitutional: Awake, alert; well-developed and well-nourished. No  acute distress is noted.  Smells of alcohol.  HEENT: Normocephalic, atraumatic; pupils are PERRL with intact EOM; oropharynx is pink and moist without exudate or erythema.  No drooling or pooling of oral secretions.  Neck: Supple, full range of motion without pain; no cervical lymphadenopathy. Normal phonation.  Cardiovascular: Tachycardic rate and rhythm, normal S1-S2.  No murmur noted.  Pulmonary: Respiratory effort regular nonlabored, breath sounds clear to auscultation all fields.  Abdomen: Soft, tender across the upper abdomen, nondistended with normoactive bowel sounds; no rebound or guarding.  Musculoskeletal: Independent range of motion of all extremities with no palpable tenderness or edema.  Neuro: Alert oriented x3, speech is clear and appropriate, GCS 15.    Skin: Flesh tone, warm, dry, intact; no erythematous or petechial rash or lesion.    Procedures           ED Course  ED Course as of 07/30/24 1957 Tue Jul 30, 2024   1619 Due to significant overcrowding in the emergency department patient was evaluated by myself in a hallway bed. This exam may be limited by privacy, noise level and the patient not wearing a hospital gown.  Explained to the patient our limitations and our overcrowding.  They were in agreement to continue the exam and treatment at this time.    [MD]      ED Course User Index  [MD] Mahsa Mac APRN      Labs Reviewed   COMPREHENSIVE METABOLIC PANEL - Abnormal; Notable for the following components:       Result Value    Glucose 57 (*)     Chloride 91 (*)     CO2 12.6 (*)     ALT (SGPT) 382 (*)     AST (SGOT) 1,175 (*)     Alkaline Phosphatase 143 (*)     Total Bilirubin 2.6 (*)     Anion Gap 33.4 (*)     All other components within normal limits    Narrative:     GFR Normal >60  Chronic Kidney Disease <60  Kidney Failure <15     URINALYSIS W/ MICROSCOPIC IF INDICATED (NO CULTURE) - Abnormal; Notable for the following components:    Appearance, UA Cloudy (*)     Ketones, UA 80 mg/dL  (3+) (*)     Protein, UA 30 mg/dL (1+) (*)     All other components within normal limits   CBC WITH AUTO DIFFERENTIAL - Abnormal; Notable for the following components:    .8 (*)     MCH 34.5 (*)     RDW 11.5 (*)     Neutrophil % 85.2 (*)     Lymphocyte % 6.2 (*)     Neutrophils, Absolute 8.73 (*)     Lymphocytes, Absolute 0.64 (*)     All other components within normal limits   LIPASE - Normal   MAGNESIUM - Normal   URINALYSIS, MICROSCOPIC ONLY   ETHANOL    Narrative:     Plasma Ethanol Clinical Symptoms:    ETOH (%)               Clinical Symptom  .01-.05              No apparent influence  .03-.12              Euphoria, Diminished judgment and attention   .09-.25              Impaired comprehension, Muscle incoordination  .18-.30              Confusion, Staggered gait, Slurred speech  .25-.40              Markedly decreased response to stimuli, unable to stand or                        walk, vomitting, sleep or stupor  .35-.50              Comatose, Anesthesia, Subnormal body temperature       AMMONIA   CBC AND DIFFERENTIAL    Narrative:     The following orders were created for panel order CBC & Differential.  Procedure                               Abnormality         Status                     ---------                               -----------         ------                     CBC Auto Differential[[id number]]        Abnormal            Final result                 Please view results for these tests on the individual orders.     CT Abdomen Pelvis With Contrast    Result Date: 7/30/2024  Impression: No acute findings in the abdomen/pelvis. Hepatic steatosis with mild hepatomegaly. Electronically Signed: Cholo Quintana  7/30/2024 6:18 PM EDT  Workstation ID: [id number]   Medications   sodium chloride 0.9 % flush 10 mL (has no administration in time range)   iopamidol (ISOVUE-370) 76 % injection 100 mL (has no administration in time range)   sodium chloride 0.9 % infusion 1,000 mL (0 mL Intravenous Stopped  7/30/24 1818)   folic acid 1 mg in sodium chloride 0.9 % 50 mL IVPB (0 mg Intravenous Stopped 7/30/24 1818)   thiamine (B-1) injection 200 mg (200 mg Intravenous Given 7/30/24 1617)   famotidine (PEPCID) injection 20 mg (20 mg Intravenous Given 7/30/24 1617)   ondansetron (ZOFRAN) injection 4 mg (4 mg Intravenous Given 7/30/24 1617)   LORazepam (ATIVAN) injection 1 mg (1 mg Intravenous Given 7/30/24 1740)   iopamidol (ISOVUE-370) 76 % injection 100 mL (100 mL Intravenous Given 7/30/24 1720)                                            Medical Decision Making  Patient presents today with the above complaint.    He had the above exam and evaluation.  He was placed on continuous cardiac monitor.  IV was established.  Labs EKG CT were obtained.  He was given a liter banana bag as well as IV Zofran.  He is mildly tremulous on exam was given 1 mg of IV Ativan.    Workup: EKG independently interpreted by Dr. Sharma myself shows sinus tachycardia with ventricular rate of 113, no acute ST or T wave changes.  CBC is unremarkable.  Metabolic panel significant for glucose of 57, CO2 12.6, , AST 1175, alk phos 143, total bili 2.6 (previously 253, 147, 112, 0.9 respectively on 5/8/2023.  Normal lipase of 41.  Magnesium within normal limits at 1.7.  Blood ethanol level 0.209%.  Urinalysis shows no evidence of infection, positive for 80 ketones.  CT of the abdomen pelvis shows no acute findings.  Hepatic steatosis with mild hepatomegaly.    Patient was given Sprite to drink for his blood glucose reading.  On reexamination patient resting quietly no distress.  He has no new complaints.  Reports some continued nausea and abdominal discomfort.  Remains mildly tachycardic.  He is afebrile.  Blood pressures are stable.    Findings were discussed with him.  He will be admitted to the hospital for further management.  Case discussed with the hospitalist respecters for.  Ammonia pending.    Problems Addressed:  Alcohol abuse:  complicated acute illness or injury  Elevated LFTs: complicated acute illness or injury  Nausea and vomiting, unspecified vomiting type: complicated acute illness or injury  Upper abdominal pain: complicated acute illness or injury    Amount and/or Complexity of Data Reviewed  Labs: ordered.  Radiology: ordered.    Risk  Prescription drug management.  Decision regarding hospitalization.        Final diagnoses:   Upper abdominal pain   Nausea and vomiting, unspecified vomiting type   Elevated LFTs   Alcohol abuse       ED Disposition  ED Disposition       ED Disposition   Decision to Admit    Condition   --    Comment   --               No follow-up provider specified.       Medication List      No changes were made to your prescriptions during this visit.            Mahsa Mac, APRN  07/30/24 1957

## 2024-07-30 NOTE — ED NOTES
RN called down to have lab add green tube ETOH added to previous specimen sent down earlier. Lab agreed and stated would run.

## 2024-07-31 ENCOUNTER — INPATIENT HOSPITAL (OUTPATIENT)
Dept: URBAN - METROPOLITAN AREA HOSPITAL 84 | Facility: HOSPITAL | Age: 34
End: 2024-07-31

## 2024-07-31 DIAGNOSIS — R74.01 ELEVATION OF LEVELS OF LIVER TRANSAMINASE LEVELS: ICD-10-CM

## 2024-07-31 DIAGNOSIS — R11.2 NAUSEA WITH VOMITING, UNSPECIFIED: ICD-10-CM

## 2024-07-31 DIAGNOSIS — F10.10 ALCOHOL ABUSE, UNCOMPLICATED: ICD-10-CM

## 2024-07-31 DIAGNOSIS — F19.90 OTHER PSYCHOACTIVE SUBSTANCE USE, UNSPECIFIED, UNCOMPLICATED: ICD-10-CM

## 2024-07-31 LAB
ALBUMIN SERPL-MCNC: 4 G/DL (ref 3.5–5.2)
ALBUMIN/GLOB SERPL: 2 G/DL
ALP SERPL-CCNC: 110 U/L (ref 39–117)
ALPHA-FETOPROTEIN: 6.65 NG/ML (ref 0–8.3)
ALPHA1 GLOB MFR UR ELPH: 111 MG/DL (ref 90–200)
ALT SERPL W P-5'-P-CCNC: 315 U/L (ref 1–41)
ANION GAP SERPL CALCULATED.3IONS-SCNC: 16.8 MMOL/L (ref 5–15)
AST SERPL-CCNC: 594 U/L (ref 1–40)
BASOPHILS # BLD AUTO: 0.03 10*3/MM3 (ref 0–0.2)
BASOPHILS NFR BLD AUTO: 0.5 % (ref 0–1.5)
BILIRUB SERPL-MCNC: 1 MG/DL (ref 0–1.2)
BUN SERPL-MCNC: 9 MG/DL (ref 6–20)
BUN/CREAT SERPL: 11.8 (ref 7–25)
CALCIUM SPEC-SCNC: 9 MG/DL (ref 8.6–10.5)
CERULOPLASMIN SERPL-MCNC: 14 MG/DL (ref 16–31)
CHLORIDE SERPL-SCNC: 97 MMOL/L (ref 98–107)
CO2 SERPL-SCNC: 20.2 MMOL/L (ref 22–29)
CREAT SERPL-MCNC: 0.76 MG/DL (ref 0.76–1.27)
DEPRECATED RDW RBC AUTO: 41.8 FL (ref 37–54)
EGFRCR SERPLBLD CKD-EPI 2021: 122.5 ML/MIN/1.73
EOSINOPHIL # BLD AUTO: 0.02 10*3/MM3 (ref 0–0.4)
EOSINOPHIL NFR BLD AUTO: 0.3 % (ref 0.3–6.2)
ERYTHROCYTE [DISTWIDTH] IN BLOOD BY AUTOMATED COUNT: 11.6 % (ref 12.3–15.4)
GLOBULIN UR ELPH-MCNC: 2 GM/DL
GLUCOSE SERPL-MCNC: 113 MG/DL (ref 65–99)
HAV IGM SERPL QL IA: ABNORMAL
HBV CORE IGM SERPL QL IA: ABNORMAL
HBV SURFACE AG SERPL QL IA: ABNORMAL
HCT VFR BLD AUTO: 42.3 % (ref 37.5–51)
HCV AB SER QL: REACTIVE
HGB BLD-MCNC: 14.7 G/DL (ref 13–17.7)
IMM GRANULOCYTES # BLD AUTO: 0.02 10*3/MM3 (ref 0–0.05)
IMM GRANULOCYTES NFR BLD AUTO: 0.3 % (ref 0–0.5)
INR PPP: 1.07 (ref 0.93–1.1)
IRON 24H UR-MRATE: 206 MCG/DL (ref 59–158)
LYMPHOCYTES # BLD AUTO: 1.44 10*3/MM3 (ref 0.7–3.1)
LYMPHOCYTES NFR BLD AUTO: 21.9 % (ref 19.6–45.3)
MAGNESIUM SERPL-MCNC: 1.6 MG/DL (ref 1.6–2.6)
MCH RBC QN AUTO: 34.2 PG (ref 26.6–33)
MCHC RBC AUTO-ENTMCNC: 34.8 G/DL (ref 31.5–35.7)
MCV RBC AUTO: 98.4 FL (ref 79–97)
MONOCYTES # BLD AUTO: 0.68 10*3/MM3 (ref 0.1–0.9)
MONOCYTES NFR BLD AUTO: 10.3 % (ref 5–12)
NEUTROPHILS NFR BLD AUTO: 4.4 10*3/MM3 (ref 1.7–7)
NEUTROPHILS NFR BLD AUTO: 66.7 % (ref 42.7–76)
NRBC BLD AUTO-RTO: 0 /100 WBC (ref 0–0.2)
PLATELET # BLD AUTO: 155 10*3/MM3 (ref 140–450)
PMV BLD AUTO: 9.3 FL (ref 6–12)
POTASSIUM SERPL-SCNC: 4.2 MMOL/L (ref 3.5–5.2)
PROT SERPL-MCNC: 6 G/DL (ref 6–8.5)
PROTHROMBIN TIME: 11.6 SECONDS (ref 9.6–11.7)
RBC # BLD AUTO: 4.3 10*6/MM3 (ref 4.14–5.8)
SODIUM SERPL-SCNC: 134 MMOL/L (ref 136–145)
WBC NRBC COR # BLD AUTO: 6.59 10*3/MM3 (ref 3.4–10.8)

## 2024-07-31 PROCEDURE — 86015 ACTIN ANTIBODY EACH: CPT | Performed by: NURSE PRACTITIONER

## 2024-07-31 PROCEDURE — 86376 MICROSOMAL ANTIBODY EACH: CPT | Performed by: NURSE PRACTITIONER

## 2024-07-31 PROCEDURE — 99223 1ST HOSP IP/OBS HIGH 75: CPT | Performed by: NURSE PRACTITIONER

## 2024-07-31 PROCEDURE — G0378 HOSPITAL OBSERVATION PER HR: HCPCS

## 2024-07-31 PROCEDURE — 81256 HFE GENE: CPT | Performed by: NURSE PRACTITIONER

## 2024-07-31 PROCEDURE — 86381 MITOCHONDRIAL ANTIBODY EACH: CPT | Performed by: NURSE PRACTITIONER

## 2024-07-31 PROCEDURE — 25010000002 LORAZEPAM PER 2 MG: Performed by: NURSE PRACTITIONER

## 2024-07-31 PROCEDURE — 86038 ANTINUCLEAR ANTIBODIES: CPT | Performed by: NURSE PRACTITIONER

## 2024-07-31 PROCEDURE — 80074 ACUTE HEPATITIS PANEL: CPT | Performed by: NURSE PRACTITIONER

## 2024-07-31 PROCEDURE — 25010000002 THIAMINE PER 100 MG: Performed by: NURSE PRACTITIONER

## 2024-07-31 PROCEDURE — 82105 ALPHA-FETOPROTEIN SERUM: CPT | Performed by: NURSE PRACTITIONER

## 2024-07-31 PROCEDURE — 85610 PROTHROMBIN TIME: CPT | Performed by: NURSE PRACTITIONER

## 2024-07-31 RX ORDER — TRAMADOL HYDROCHLORIDE 50 MG/1
50 TABLET ORAL EVERY 6 HOURS PRN
Status: DISCONTINUED | OUTPATIENT
Start: 2024-07-31 | End: 2024-08-02 | Stop reason: HOSPADM

## 2024-07-31 RX ORDER — ACETAMINOPHEN 500 MG
1000 TABLET ORAL EVERY 8 HOURS PRN
Status: DISCONTINUED | OUTPATIENT
Start: 2024-07-31 | End: 2024-07-31

## 2024-07-31 RX ADMIN — FOLIC ACID 1 MG: 1 TABLET ORAL at 08:36

## 2024-07-31 RX ADMIN — PANTOPRAZOLE SODIUM 40 MG: 40 TABLET, DELAYED RELEASE ORAL at 05:33

## 2024-07-31 RX ADMIN — THIAMINE HYDROCHLORIDE 200 MG: 100 INJECTION, SOLUTION INTRAMUSCULAR; INTRAVENOUS at 11:11

## 2024-07-31 RX ADMIN — LORAZEPAM 1 MG: 1 TABLET ORAL at 13:16

## 2024-07-31 RX ADMIN — LORAZEPAM 2 MG: 2 INJECTION INTRAMUSCULAR; INTRAVENOUS at 19:53

## 2024-07-31 RX ADMIN — TRAMADOL HYDROCHLORIDE 50 MG: 50 TABLET ORAL at 08:54

## 2024-07-31 RX ADMIN — LORAZEPAM 1 MG: 2 INJECTION INTRAMUSCULAR; INTRAVENOUS at 15:01

## 2024-07-31 RX ADMIN — THIAMINE HYDROCHLORIDE 200 MG: 100 INJECTION, SOLUTION INTRAMUSCULAR; INTRAVENOUS at 00:14

## 2024-07-31 RX ADMIN — THIAMINE HYDROCHLORIDE 200 MG: 100 INJECTION, SOLUTION INTRAMUSCULAR; INTRAVENOUS at 15:04

## 2024-07-31 RX ADMIN — TRAMADOL HYDROCHLORIDE 50 MG: 50 TABLET ORAL at 15:01

## 2024-07-31 RX ADMIN — Medication 1 TABLET: at 08:36

## 2024-07-31 RX ADMIN — THIAMINE HYDROCHLORIDE 200 MG: 100 INJECTION, SOLUTION INTRAMUSCULAR; INTRAVENOUS at 22:19

## 2024-07-31 RX ADMIN — LORAZEPAM 1 MG: 2 INJECTION INTRAMUSCULAR; INTRAVENOUS at 00:14

## 2024-07-31 RX ADMIN — NICOTINE 1 PATCH: 21 PATCH TRANSDERMAL at 08:36

## 2024-07-31 NOTE — CASE MANAGEMENT/SOCIAL WORK
Discharge Planning Assessment   Jordi     Patient Name: Jose Luis Elizabeth  MRN: [id number]  Today's Date: 7/31/2024    Admit Date: 7/30/2024    Plan: Home alone   Discharge Needs Assessment       Row Name 07/31/24 1353       Living Environment    People in Home alone    Current Living Arrangements home    Potentially Unsafe Housing Conditions none    In the past 12 months has the electric, gas, oil, or water company threatened to shut off services in your home? No    Primary Care Provided by self    Provides Primary Care For no one    Family Caregiver if Needed parent(s)    Family Caregiver Names Mother Sera    Quality of Family Relationships helpful;involved;supportive    Able to Return to Prior Arrangements yes       Resource/Environmental Concerns    Resource/Environmental Concerns none    Transportation Concerns none       Transportation Needs    In the past 12 months, has lack of transportation kept you from medical appointments or from getting medications? no    In the past 12 months, has lack of transportation kept you from meetings, work, or from getting things needed for daily living? No       Food Insecurity    Within the past 12 months, you worried that your food would run out before you got the money to buy more. Never true    Within the past 12 months, the food you bought just didn't last and you didn't have money to get more. Never true       Transition Planning    Patient/Family Anticipates Transition to home    Patient/Family Anticipated Services at Transition none    Transportation Anticipated car, drives self;family or friend will provide       Discharge Needs Assessment    Readmission Within the Last 30 Days no previous admission in last 30 days    Equipment Currently Used at Home none    Concerns to be Addressed discharge planning;substance/tobacco abuse/use;care coordination/care conferences    Anticipated Changes Related to Illness none    Equipment Needed After Discharge none     Current Discharge Risk substance use/abuse              Discharge Plan       Row Name 07/31/24 0867       Plan    Plan Home alone    Patient/Family in Agreement with Plan yes    Plan Comments CM met with patient at bedside. Pt lives alone but has family that can assist him as needed. Pt drinks a 5th of alcohol daily. He states he came in to detox. He has been having some Hallucinations, anxiety, shaking and forgetfulness. CM discussed alcohol rehab Pt does not want to go to rehab but has agreed to meet with  and discuss options available to him. Pt states he is a former Heroin addict and when he was finally tired of using, he was able to quit. He stated it will be the same with alcohol, once he is tired of doing this he will quit. Pt states he is at that point and he has been with a home group  and knows he will be accepted back with them. CM let pt know that SW will be meeting  with him to discuss options so if he needs additional help he has the information to get the help he needs. Pt does not have a PCP and would prefer to set up his own. [facility]  [phone] and [facility] [place] Pt connection [phone] to [facility] for patient to set up PCP. CM notified nurse of pts request for Ativan and report of Hallucinations. CM sent referral (secure chat) to . [place] Barriers: Gastro consult, [facility]              Demographic Summary       Row Name 07/31/24 2958       General Information    Admission Type inpatient    Arrived From emergency department    Referral Source admission list    Reason for Consult discharge planning    Preferred Language English       Contact Information    Permission Granted to Share Info With               Functional Status       Row Name 07/31/24 5446       Functional Status    Usual Activity Tolerance good    Current Activity Tolerance good       Functional Status, IADL    Medications independent    Meal Preparation independent    Housekeeping independent     Laundry independent    Shopping independent       Mental Status    General Appearance WDL WDL       Mental Status Summary    Recent Changes in Mental Status/Cognitive Functioning no changes       Employment/    Employment Status employed full-time             Lizette Boss RN    phone [phone]  fax [phone]

## 2024-07-31 NOTE — ED NOTES
Nursing report ED to floor  Jose Luis Elizabeth  33 y.o.  male    HPI:   Chief Complaint   Patient presents with    Nausea       Admitting doctor:   Tiana Zamora MD    Admitting diagnosis:   The primary encounter diagnosis was Upper abdominal pain. Diagnoses of Nausea and vomiting, unspecified vomiting type, Elevated LFTs, and Alcohol abuse were also pertinent to this visit.    Code status:   Current Code Status       Date Active Code Status Order ID Comments User Context       7/30/2024 2000 CPR (Attempt to Resuscitate) [id number]  Nara Del Rosario APRN ED        Question Answer    Code Status (Patient has no pulse and is not breathing) CPR (Attempt to Resuscitate)    Medical Interventions (Patient has pulse or is breathing) Full Support                    Allergies:   Patient has no known allergies.    Isolation:  No active isolations     Fall Risk:  Fall Risk Assessment was completed, and patient is at low risk for falls.   Predictive Model Details         3 (Low) Factor Value    Calculated 7/30/2024 20:45 Active Peripheral IV Present    Risk of Fall Model Imaging order in this encounter Present     Respiratory Rate 22     Age 33     Number of Distinct Medication Classes administered 7     Magnesium 1.7 mg/dL     Drug Use Not Asked     Total Bilirubin 2.6 mg/dL     Chloride 91 mmol/L      U/L     Tobacco Use Current     Jaxson Scale not on file     Diastolic BP 73     Clinically Relevant Sex Not Female     Number of administrations of Ulcer Drugs 1     Cardiac Assessment X     Calcium 9.3 mg/dL     Days after Admission 0.254     Potassium 4.5 mmol/L     Creatinine 0.94 mg/dL     Albumin 4.8 g/dL         Weight:   There were no vitals filed for this visit.    Intake and Output    Intake/Output Summary (Last 24 hours) at 7/30/2024 2045  Last data filed at 7/30/2024 1818  Gross per 24 hour   Intake 1050 ml   Output --   Net 1050 ml       Diet:   Dietary Orders (From admission, onward)        "Start     Ordered    07/30/24 2002  Diet: Regular/House; Fluid Consistency: Thin (IDDSI 0)  Diet Effective Now        References:    Diet Order Crosswalk   Question Answer Comment   Diets: Regular/House    Fluid Consistency: Thin (IDDSI 0)        07/30/24 2002                     Most recent vitals:   Vitals:    07/30/24 1507 07/30/24 1539 07/30/24 1816 07/30/24 2008   BP: 126/83  141/95 116/73   BP Location: Left arm      Patient Position: Sitting      Pulse: (!) 124  119 99   Resp: 16 22 22   Temp:  97.2 °F (36.2 °C)     TempSrc:  Axillary     SpO2: 99%  97% 94%   Height: 177.8 cm (70\")          Active LDAs/IV Access:   Lines, Drains & Airways       Active LDAs       Name Placement date Placement time Site Days    Peripheral IV 07/30/24 1603 Right Antecubital 07/30/24  1603  Antecubital  less than 1                    Skin Condition:   Skin Assessments (last day)       None             Labs (abnormal labs have a star):   Labs Reviewed   COMPREHENSIVE METABOLIC PANEL - Abnormal; Notable for the following components:       Result Value    Glucose 57 (*)     Chloride 91 (*)     CO2 12.6 (*)     ALT (SGPT) 382 (*)     AST (SGOT) 1,175 (*)     Alkaline Phosphatase 143 (*)     Total Bilirubin 2.6 (*)     Anion Gap 33.4 (*)     All other components within normal limits    Narrative:     GFR Normal >60  Chronic Kidney Disease <60  Kidney Failure <15     URINALYSIS W/ MICROSCOPIC IF INDICATED (NO CULTURE) - Abnormal; Notable for the following components:    Appearance, UA Cloudy (*)     Ketones, UA 80 mg/dL (3+) (*)     Protein, UA 30 mg/dL (1+) (*)     All other components within normal limits   CBC WITH AUTO DIFFERENTIAL - Abnormal; Notable for the following components:    .8 (*)     MCH 34.5 (*)     RDW 11.5 (*)     Neutrophil % 85.2 (*)     Lymphocyte % 6.2 (*)     Neutrophils, Absolute 8.73 (*)     Lymphocytes, Absolute 0.64 (*)     All other components within normal limits   POCT GLUCOSE FINGERSTICK - " Abnormal; Notable for the following components:    Glucose 69 (*)     All other components within normal limits   LIPASE - Normal   MAGNESIUM - Normal   AMMONIA - Normal   URINALYSIS, MICROSCOPIC ONLY   ETHANOL    Narrative:     Plasma Ethanol Clinical Symptoms:    ETOH (%)               Clinical Symptom  .01-.05              No apparent influence  .03-.12              Euphoria, Diminished judgment and attention   .09-.25              Impaired comprehension, Muscle incoordination  .18-.30              Confusion, Staggered gait, Slurred speech  .25-.40              Markedly decreased response to stimuli, unable to stand or                        walk, vomitting, sleep or stupor  .35-.50              Comatose, Anesthesia, Subnormal body temperature       CBC AND DIFFERENTIAL    Narrative:     The following orders were created for panel order CBC & Differential.  Procedure                               Abnormality         Status                     ---------                               -----------         ------                     CBC Auto Differential[[id number]]        Abnormal            Final result                 Please view results for these tests on the individual orders.       LOC: Person, Place, Time, and Situation    Telemetry:  Med/Surg    Cardiac Monitoring Ordered: yes    EKG:   ECG 12 Lead Drug Monitoring; ETOH   Preliminary Result   HEART VJXM=513  bpm   RR Etuvlykx=820  ms   NH Uhuzihmj=787  ms   P Horizontal Axis=40  deg   P Front Axis=60  deg   QRSD Interval=89  ms   QT Mplknrla=985  ms   EDmG=792  ms   QRS Axis=37  deg   T Wave Axis=23  deg   - OTHERWISE NORMAL ECG -   Sinus tachycardia   No previous ECG available for comparison   Date and Time of Study:2024-07-30 15:12:14          Medications Given in the ED:   Medications   sodium chloride 0.9 % flush 10 mL (has no administration in time range)   iopamidol (ISOVUE-370) 76 % injection 100 mL (has no administration in time range)   Magnesium  Standard Dose Replacement - Follow Nurse / BPA Driven Protocol (has no administration in time range)   sodium chloride 0.9 % infusion (has no administration in time range)   ondansetron ODT (ZOFRAN-ODT) disintegrating tablet 4 mg (has no administration in time range)     Or   ondansetron (ZOFRAN) injection 4 mg (has no administration in time range)   thiamine (B-1) injection 200 mg (has no administration in time range)     Followed by   thiamine (VITAMIN B-1) tablet 100 mg (has no administration in time range)   folic acid (FOLVITE) tablet 1 mg (has no administration in time range)   multivitamin with minerals 1 tablet (has no administration in time range)   LORazepam (ATIVAN) tablet 1 mg (has no administration in time range)     Or   LORazepam (ATIVAN) injection 1 mg (has no administration in time range)     Or   LORazepam (ATIVAN) tablet 2 mg (has no administration in time range)     Or   LORazepam (ATIVAN) injection 2 mg (has no administration in time range)     Or   LORazepam (ATIVAN) injection 2 mg (has no administration in time range)     Or   LORazepam (ATIVAN) injection 2 mg (has no administration in time range)   labetalol (NORMODYNE,TRANDATE) injection 10 mg (has no administration in time range)   ketorolac (TORADOL) injection 15 mg (has no administration in time range)   sodium chloride 0.9 % infusion 1,000 mL (0 mL Intravenous Stopped 7/30/24 1818)   folic acid 1 mg in sodium chloride 0.9 % 50 mL IVPB (0 mg Intravenous Stopped 7/30/24 1818)   thiamine (B-1) injection 200 mg (200 mg Intravenous Given 7/30/24 1617)   famotidine (PEPCID) injection 20 mg (20 mg Intravenous Given 7/30/24 1617)   ondansetron (ZOFRAN) injection 4 mg (4 mg Intravenous Given 7/30/24 1617)   LORazepam (ATIVAN) injection 1 mg (1 mg Intravenous Given 7/30/24 1740)   iopamidol (ISOVUE-370) 76 % injection 100 mL (100 mL Intravenous Given 7/30/24 1720)       Imaging results:  CT Abdomen Pelvis With Contrast    Result Date:  7/30/2024  Impression: No acute findings in the abdomen/pelvis. Hepatic steatosis with mild hepatomegaly. Electronically Signed: Cholo Quintana  7/30/2024 6:18 PM EDT  Workstation ID: [id number]     Social issues:   Social History     Socioeconomic History    Marital status:    Tobacco Use    Smoking status: Every Day     Current packs/day: 1.50     Average packs/day: 1.5 packs/day for 15.0 years (22.5 ttl pk-yrs)     Types: Cigarettes    Smokeless tobacco: Never   Vaping Use    Vaping status: Never Used   Substance and Sexual Activity    Alcohol use: Not Currently     Comment: SOCIALLY        NIH Stroke Scale:  Interval: (not recorded)  1a. Level of Consciousness: (not recorded)  1b. LOC Questions: (not recorded)  1c. LOC Commands: (not recorded)  2. Best Gaze: (not recorded)  3. Visual: (not recorded)  4. Facial Palsy: (not recorded)  5a. Motor Arm, Left: (not recorded)  5b. Motor Arm, Right: (not recorded)  6a. Motor Leg, Left: (not recorded)  6b. Motor Leg, Right: (not recorded)  7. Limb Ataxia: (not recorded)  8. Sensory: (not recorded)  9. Best Language: (not recorded)  10. Dysarthria: (not recorded)  11. Extinction and Inattention (formerly Neglect): (not recorded)    Total (NIH Stroke Scale): (not recorded)     Additional notable assessment information:     Nursing report ED to floor:  Bertha Kee RN   07/30/24 20:45 EDT

## 2024-07-31 NOTE — DISCHARGE INSTR - OTHER ORDERS
Call to set up Primary Care Provider:   Primary Care in  [phone] or   [facility] Patient connection [phone]

## 2024-07-31 NOTE — H&P
[facility] Medicine Services  History & Physical    Patient Name: Jose Luis Elizabeth  : 1990  MRN: [id number]  Primary Care Physician:  Provider, No Known  Date of admission: 2024  Date and Time of Service: 2024 at 2015    Subjective      Chief Complaint: abdominal pain     History of Present Illness: Jose Luis Elizabeth is a 33 y.o. male with a H of alcohol abuse reports 1/5 of liquor daily, tobacco abuse, former IV drug use positive for hep C in past who presented to [facility] on 2024 with complaints of upper abdominal pain nausea and vomiting.  He denies any fever, hematemesis, hematochezia or melena.  He reports he feels rather drowsy today and gets shaky and anxious to be goes longer than 2 to 3 hours without alcohol.  He is awake and answering appropriately.    Labs today show alk phos 143, AST 1175  total bilirubin 2.6.  Review of records shows he was seen in the emergency department in May 2023 with similar complaints and at that time AST was 147  and bilirubin 0.9.  Ethanol level today is 0.209.  EKG shows sinus tachycardia no ischemic changes acutely, CT abdomen pelvis with contrast per radiology shows no acute findings in the abdomen pelvis hepatic steatosis with mild hepatomegaly.  He was given a banana bag in the emergency room folic acid and B1 injection as well as 1 mg lorazepam for tremors.  He was given IV Zofran and Pepcid in the emergency department.  CIWA precautions have been put in place, case management psychiatry consulted.  Gastroenterology consulted.  Review of Systems   Constitutional: Negative.    HENT: Negative.     Eyes: Negative.    Respiratory: Negative.     Cardiovascular: Negative.    Gastrointestinal:  Positive for abdominal pain.   Endocrine: Negative.    Genitourinary: Negative.    Musculoskeletal: Negative.    Skin: Negative.    Allergic/Immunologic: Negative.    Neurological: Negative.    Hematological:  Negative.    Psychiatric/Behavioral:  Positive for behavioral problems.    All other systems reviewed and are negative.      Personal History     Past Medical History:   Diagnosis Date    Substance abuse        History reviewed. No pertinent surgical history.    Family History: family history is not on file. Otherwise pertinent FHx was reviewed and not pertinent to current issue.    Social History:  reports that he has been smoking cigarettes. He has a 22.5 pack-year smoking history. He has never used smokeless tobacco. He reports current alcohol use of about 20.0 standard drinks of alcohol per week. He reports that he does not currently use drugs after having used the following drugs: Heroin.    Home Medications:  Prior to Admission Medications       Prescriptions Last Dose Informant Patient Reported? Taking?    azithromycin (ZITHROMAX) 250 MG tablet   No No    Take 2 tablets the first day, then 1 tablet daily for 4 days.    fluticasone (FLONASE) 50 MCG/ACT nasal spray   No No    2 sprays into the nostril(s) as directed by provider Daily for 14 days.    ondansetron ODT (ZOFRAN-ODT) 4 MG disintegrating tablet   No No    Place 1 tablet on the tongue Every 8 (Eight) Hours As Needed for Nausea.    pantoprazole (PROTONIX) 40 MG EC tablet   No No    Take 1 tablet by mouth Daily.              Allergies:  No Known Allergies    Objective      Vitals:   Temp:  [97.2 °F (36.2 °C)-98.1 °F (36.7 °C)] 98.1 °F (36.7 °C)  Heart Rate:  [] 103  Resp:  [16-22] 16  BP: (116-141)/(73-95) 121/83  Body mass index is 24.45 kg/m².  Physical Exam  Vitals reviewed.   Constitutional:       Appearance: Normal appearance. He is normal weight.   HENT:      Head: Normocephalic and atraumatic.      Right Ear: External ear normal.      Left Ear: External ear normal.      Nose: Nose normal.      Mouth/Throat:      Mouth: Mucous membranes are moist.   Eyes:      Extraocular Movements: Extraocular movements intact.   Cardiovascular:      Rate  and Rhythm: Regular rhythm. Tachycardia present.      Pulses: Normal pulses.      Heart sounds: Normal heart sounds.   Pulmonary:      Effort: Pulmonary effort is normal.      Breath sounds: Normal breath sounds.   Abdominal:      Palpations: Abdomen is soft.   Genitourinary:     Comments: deferred  Musculoskeletal:         General: Normal range of motion.      Cervical back: Normal range of motion and neck supple.   Skin:     General: Skin is warm and dry.   Neurological:      General: No focal deficit present.      Mental Status: He is alert and oriented to person, place, and time.   Psychiatric:         Mood and Affect: Mood normal.         Behavior: Behavior normal.         Thought Content: Thought content normal.         Judgment: Judgment normal.         Diagnostic Data:            Lab Results (last 24 hours)       Procedure Component Value Units Date/Time    Ammonia [[id number]]  (Normal) Collected: 07/30/24 2005    Specimen: Blood Updated: 07/30/24 2030     Ammonia 26 umol/L     POC Glucose Once [[id number]]  (Abnormal) Collected: 07/30/24 2002    Specimen: Blood Updated: 07/30/24 2004     Glucose 69 mg/dL      Comment: Serial Number: [id number]:  [id number]       Ethanol [[id number]] Collected: 07/30/24 1602    Specimen: Blood Updated: 07/30/24 1717     Ethanol % 0.209 %     Narrative:      Plasma Ethanol Clinical Symptoms:    ETOH (%)               Clinical Symptom  .01-.05              No apparent influence  .03-.12              Euphoria, Diminished judgment and attention   .09-.25              Impaired comprehension, Muscle incoordination  .18-.30              Confusion, Staggered gait, Slurred speech  .25-.40              Markedly decreased response to stimuli, unable to stand or                        walk, vomitting, sleep or stupor  .35-.50              Comatose, Anesthesia, Subnormal body temperature        Comprehensive Metabolic Panel [[id number]]  (Abnormal) Collected: 07/30/24 1602     Specimen: Blood Updated: 07/30/24 1645     Glucose 57 mg/dL      BUN 11 mg/dL      Creatinine 0.94 mg/dL      Sodium 137 mmol/L      Potassium 4.5 mmol/L      Comment: Slight hemolysis detected by analyzer. Result may be falsely elevated.        Chloride 91 mmol/L      CO2 12.6 mmol/L      Calcium 9.3 mg/dL      Total Protein 7.4 g/dL      Albumin 4.8 g/dL      ALT (SGPT) 382 U/L      AST (SGOT) 1,175 U/L      Comment: Slight hemolysis detected by analyzer. Result may be falsely elevated.        Alkaline Phosphatase 143 U/L      Total Bilirubin 2.6 mg/dL      Globulin 2.6 gm/dL      A/G Ratio 1.8 g/dL      BUN/Creatinine Ratio 11.7     Anion Gap 33.4 mmol/L      eGFR 110.5 mL/min/1.73     Narrative:      GFR Normal >60  Chronic Kidney Disease <60  Kidney Failure <15      Lipase [[id number]]  (Normal) Collected: 07/30/24 1602    Specimen: Blood Updated: 07/30/24 1644     Lipase 41 U/L     Magnesium [[id number]]  (Normal) Collected: 07/30/24 1602    Specimen: Blood Updated: 07/30/24 1644     Magnesium 1.7 mg/dL     Urinalysis With Microscopic If Indicated (No Culture) - Urine, Clean Catch [[id number]]  (Abnormal) Collected: 07/30/24 1621    Specimen: Urine, Clean Catch Updated: 07/30/24 1634     Color, UA Yellow     Appearance, UA Cloudy     pH, UA <=5.0     Specific Gravity, UA 1.017     Glucose, UA Negative     Ketones, UA 80 mg/dL (3+)     Bilirubin, UA Negative     Blood, UA Negative     Protein, UA 30 mg/dL (1+)     Leuk Esterase, UA Negative     Nitrite, UA Negative     Urobilinogen, UA 1.0 E.U./dL    Urinalysis, Microscopic Only - Urine, Clean Catch [[id number]] Collected: 07/30/24 1621    Specimen: Urine, Clean Catch Updated: 07/30/24 1634     RBC, UA 0-2 /HPF      WBC, UA 0-2 /HPF      Bacteria, UA None Seen /HPF      Squamous Epithelial Cells, UA 0-2 /HPF      Hyaline Casts, UA 3-6 /LPF      Methodology Automated Microscopy    CBC & Differential [[id number]]  (Abnormal) Collected: 07/30/24 1602    Specimen: Blood  Updated: 07/30/24 1617    Narrative:      The following orders were created for panel order CBC & Differential.  Procedure                               Abnormality         Status                     ---------                               -----------         ------                     CBC Auto Differential[[id number]]        Abnormal            Final result                 Please view results for these tests on the individual orders.    CBC Auto Differential [[id number]]  (Abnormal) Collected: 07/30/24 1602    Specimen: Blood Updated: 07/30/24 1617     WBC 10.25 10*3/mm3      RBC 4.99 10*6/mm3      Hemoglobin 17.2 g/dL      Hematocrit 50.3 %      .8 fL      MCH 34.5 pg      MCHC 34.2 g/dL      RDW 11.5 %      RDW-SD 42.4 fl      MPV 9.1 fL      Platelets 203 10*3/mm3      Neutrophil % 85.2 %      Lymphocyte % 6.2 %      Monocyte % 7.4 %      Eosinophil % 0.3 %      Basophil % 0.4 %      Immature Grans % 0.5 %      Neutrophils, Absolute 8.73 10*3/mm3      Lymphocytes, Absolute 0.64 10*3/mm3      Monocytes, Absolute 0.76 10*3/mm3      Eosinophils, Absolute 0.03 10*3/mm3      Basophils, Absolute 0.04 10*3/mm3      Immature Grans, Absolute 0.05 10*3/mm3      nRBC 0.0 /100 WBC              Imaging Results (Last 24 Hours)       Procedure Component Value Units Date/Time    CT Abdomen Pelvis With Contrast [[id number]] Collected: 07/30/24 1809     Updated: 07/30/24 1820    Narrative:      CT ABDOMEN PELVIS W CONTRAST    Date of Exam: 7/30/2024 5:19 PM EDT    Indication: abd pain/vomiting/elvated lfts.    Comparison: CT abdomen/pelvis 5/8/2023.    Technique: Axial CT images were obtained of the abdomen and pelvis following the uneventful intravenous administration of iodinated contrast. Sagittal and coronal reconstructions were performed.  Automated exposure control and iterative reconstruction   methods were used.        Findings:  Lung Bases: No significant abnormality.    Liver: Hepatomegaly measuring 19 mm. Hepatic  steatosis.     Gallbladder and biliary tree: No significant abnormality.     Spleen: No significant abnormality.     Pancreas: No significant abnormality.     Adrenal glands: No significant abnormality.     Kidneys and ureters: No significant abnormality.     Stomach and duodenum:No significant abnormality.    Small and large bowel: No evidence of obstruction. Normal appendix.    Peritoneal cavity: No free fluid or free air.    Bladder: No significant abnormality.     Pelvic organs: No significant abnormality.     Vasculature: No significant abnormality.     Lymph nodes: No pathologic appearing lymph nodes by imaging criteria.     Bones and soft tissues: No significant abnormality.      Impression:      Impression:  No acute findings in the abdomen/pelvis.    Hepatic steatosis with mild hepatomegaly.            Electronically Signed: Cholo Quintana    7/30/2024 6:18 PM EDT    Workstation ID: [id number]              Assessment & Plan        This is a 33 y.o. male with:    Active and Resolved Problems  Active Hospital Problems    Diagnosis  POA    **Abdominal pain [R10.9]  Yes     Priority: High    Alcohol withdrawal syndrome without complication [F10.930]  Yes     Priority: Medium    Hyperbilirubinemia [E80.6]  Yes     Priority: Medium    Tobacco use [Z72.0]  Yes      Resolved Hospital Problems   No resolved problems to display.       Abdominal pain, nausea vomiting CT abdomen negative for acute, 4 mg IV Zofran every 6 hours as needed nausea, gastroenterology consulted for hepatomegaly hepatic steatosis secondary to alcohol use, 40 mg p.o. Protonix daily,    Alcohol withdrawal syndrome, mildly tachycardic stable on room air, ethanol 0.209, CIWA precautions in place for as needed Ativan, case management consulted, psychiatry consulted for possible rehab, banana bag in ED, on folate and thiamine, check magnesium in a.m.    Hyperbilirubinemia elevated LFTs increased since May 2023 likely secondary to alcohol use, avoid  acetaminophen, gastroenterology consulted see plans above    Tobacco use, encourage cessation 21 mg nicotine patch ordered    VTE Prophylaxis:  Mechanical VTE prophylaxis orders are present.        The patient desires to be as follows:    CODE STATUS:    Code Status (Patient has no pulse and is not breathing): CPR (Attempt to Resuscitate)  Medical Interventions (Patient has pulse or is breathing): Full Support            Admission Status:  I believe this patient meets inpatient  status.    Expected Length of Stay: pending clinical course     PDMP and Medication Dispenses via Sidebar reviewed and consistent with patient reported medications.    I discussed the patient's findings and my recommendations with patient and family.      Signature:     This document has been electronically signed by DOMENICO Saini on July 30, 2024 23:12 EDT   [facility] Hospitalist Team

## 2024-07-31 NOTE — CONSULTS
"GI CONSULT  NOTE:    Referring Provider:  Dr. Harris    Chief complaint: Abdominal pain, alcohol abuse, elevated LFTs    Subjective . \"I was so weak\"    History of present illness: Jose Luis Elizabeth is a 33 y.o. male who has a history of alcohol and drug abuse.  He reports a history of IV drug use/snorting drugs but not recently.  He drinks 1/5 of vodka daily and reports symptoms of withdrawal if he does not drink routinely.  He presents with nausea and vomiting, no hematemesis or melena.  He denies any current abdominal pain.  Occasional constipation or diarrhea but typically has regular bowel habits.  Appetite better today but has been down recently as he reports recent COVID.  He denies any family history of liver disease.  No previous blood transfusions.  He does have tattoos but denies exposure to hepatitis.  He does report history of elevated LFTs that he attributes to alcohol abuse.    Endo History:  No previous EGD or colonoscopy    Past Medical History:  Past Medical History:   Diagnosis Date    Substance abuse        Past Surgical History:  History reviewed. No pertinent surgical history.    Social History:  Social History     Tobacco Use    Smoking status: Every Day     Current packs/day: 1.50     Average packs/day: 1.5 packs/day for 15.0 years (22.5 ttl pk-yrs)     Types: Cigarettes    Smokeless tobacco: Never   Vaping Use    Vaping status: Never Used   Substance Use Topics    Alcohol use: Yes     Alcohol/week: 20.0 standard drinks of alcohol     Types: 20 Drinks containing 0.5 oz of alcohol per week     Comment: every day, last drink 7-29    Drug use: Not Currently     Types: Heroin     Comment: last time, 5 years ago       Family History:  Denies family history of liver disease    Medications:  (Not in a hospital admission)      Scheduled Meds:folic acid, 1 mg, Oral, Daily  iopamidol, 100 mL, Intravenous, Once in imaging  multivitamin with minerals, 1 tablet, Oral, Daily  nicotine, 1 patch, " Transdermal, Q24H  pantoprazole, 40 mg, Oral, Q AM  thiamine (B-1) IV, 200 mg, Intravenous, Q8H   Followed by  [START ON 8/5/2024] thiamine, 100 mg, Oral, Daily      Continuous Infusions:sodium chloride, 100 mL/hr, Last Rate: 100 mL/hr (07/30/24 2128)      PRN Meds:.  labetalol    LORazepam **OR** LORazepam **OR** LORazepam **OR** LORazepam **OR** LORazepam **OR** LORazepam    Magnesium Standard Dose Replacement - Follow Nurse / BPA Driven Protocol    ondansetron ODT **OR** ondansetron    [COMPLETED] Insert Peripheral IV **AND** sodium chloride    traMADol    ALLERGIES:  Patient has no known allergies.    ROS:  The following systems were reviewed   Constitution:  No fevers, chills, no unintentional weight loss  Skin: no rash, no jaundice  Eyes:  No blurry vision, no eye pain  HENT:  No change in hearing or smell  Resp:  No dyspnea or cough  CV:  No chest pain or palpitations  :  No dysuria, hematuria  Musculoskeletal:  No leg cramps or arthralgias  Neuro:  No tremor, no numbness  Psych:  No depression or confusion    Objective resting in bed, no acute distress    Vital Signs:   Vitals:    07/31/24 0130 07/31/24 0300 07/31/24 0500 07/31/24 0816   BP: 123/85 123/84 129/84    BP Location: Left arm  Left arm    Patient Position: Lying  Lying    Pulse: 107 102 92    Resp: 18  20 13   Temp: 98.8 °F (37.1 °C)  99.4 °F (37.4 °C) 98 °F (36.7 °C)   TempSrc: Oral  Oral Oral   SpO2: 94%  98%    Weight:   77.3 kg (170 lb 6.7 oz)    Height:           Physical Exam:     General Appearance:    Awake and alert, in no acute distress   Head:    Normocephalic, without obvious abnormality, atraumatic   Throat:   No oral lesions, no thrush, oral mucosa moist   Lungs:     Respirations regular, even and unlabored   Chest Wall:    No abnormalities observed   Abdomen:     Soft, non-tender, nondistended   Rectal:     Deferred   Extremities:   Moves all extremities, no cyanosis       Skin:   No rash, no jaundice, normal palpation      "  Neurologic:   Cranial nerves 2 - 12 grossly intact       Results Review:   I reviewed the patient's labs and imaging.  CBC    Results from last 7 days   Lab Units 07/30/24 2359 07/30/24  1602   WBC 10*3/mm3 6.59 10.25   HEMOGLOBIN g/dL 14.7 17.2   PLATELETS 10*3/mm3 155 203     CMP   Results from last 7 days   Lab Units 07/30/24 2359 07/30/24 2005 07/30/24  1602   SODIUM mmol/L 134*  --  137   POTASSIUM mmol/L 4.2  --  4.5   CHLORIDE mmol/L 97*  --  91*   CO2 mmol/L 20.2*  --  12.6*   BUN mg/dL 9  --  11   CREATININE mg/dL 0.76  --  0.94   GLUCOSE mg/dL 113*  --  57*   ALBUMIN g/dL 4.0  --  4.8   BILIRUBIN mg/dL 1.0  --  2.6*   ALK PHOS U/L 110  --  143*   AST (SGOT) U/L 594*  --  1,175*   ALT (SGPT) U/L 315*  --  382*   MAGNESIUM mg/dL 1.6  --  1.7   LIPASE U/L  --   --  41   AMMONIA umol/L  --  26  --      Cr Clearance Estimated Creatinine Clearance: 152.6 mL/min (by C-G formula based on SCr of 0.76 mg/dL).  Coag     HbA1C No results found for: \"HGBA1C\"  Blood Glucose   Glucose   Date/Time Value Ref Range Status   07/30/2024 2002 69 (L) 70 - 105 mg/dL Final     Comment:     Serial Number: [id number]:  [id number]     Infection     UA    Results from last 7 days   Lab Units 07/30/24  1621   NITRITE UA  Negative   WBC UA /HPF 0-2   BACTERIA UA /HPF None Seen   SQUAM EPITHEL UA /HPF 0-2     Imaging Results (Last 72 Hours)       Procedure Component Value Units Date/Time    CT Abdomen Pelvis With Contrast [[id number]] Collected: 07/30/24 1809     Updated: 07/30/24 1820    Narrative:      CT ABDOMEN PELVIS W CONTRAST    Date of Exam: 7/30/2024 5:19 PM EDT    Indication: abd pain/vomiting/elvated lfts.    Comparison: CT abdomen/pelvis 5/8/2023.    Technique: Axial CT images were obtained of the abdomen and pelvis following the uneventful intravenous administration of iodinated contrast. Sagittal and coronal reconstructions were performed.  Automated exposure control and iterative reconstruction   methods were " used.        Findings:  Lung Bases: No significant abnormality.    Liver: Hepatomegaly measuring 19 mm. Hepatic steatosis.     Gallbladder and biliary tree: No significant abnormality.     Spleen: No significant abnormality.     Pancreas: No significant abnormality.     Adrenal glands: No significant abnormality.     Kidneys and ureters: No significant abnormality.     Stomach and duodenum:No significant abnormality.    Small and large bowel: No evidence of obstruction. Normal appendix.    Peritoneal cavity: No free fluid or free air.    Bladder: No significant abnormality.     Pelvic organs: No significant abnormality.     Vasculature: No significant abnormality.     Lymph nodes: No pathologic appearing lymph nodes by imaging criteria.     Bones and soft tissues: No significant abnormality.      Impression:      Impression:  No acute findings in the abdomen/pelvis.    Hepatic steatosis with mild hepatomegaly.            Electronically Signed: Cholo Quintana    7/30/2024 6:18 PM EDT    Workstation ID: [id number]            ASSESSMENT:  Elevated LFTs -likely alcohol hepatitis  Alcohol abuse  History of drug abuse    PLAN:  Patient is a 33-year-old male with a history of drug abuse and daily alcohol abuse who presents with increased weakness and nausea with vomiting.  He is overall feeling better today.  He does report a history of withdrawal symptoms.    EtOH level 0.209 upon admission.  LFTs improving, , , alk phos 110 and total bilirubin 1.0.  Proceed with full liver serology workup including INR to assess Maddrey DF.  Alcohol withdrawal precautions.  Complete alcohol cessation encouraged.  Consult case management for outpatient resources.  Diet as tolerated and good nutrition encouraged.  Continue supportive care.    I discussed the patients findings and my recommendations with the patient.    We appreciate the referral    Electronically signed by DOMENICO Gilman, 07/31/24, 10:05 AM  EDT.

## 2024-07-31 NOTE — PLAN OF CARE
Goal Outcome Evaluation:         A&O x4. No new concerns at this time. Call light in reach.

## 2024-07-31 NOTE — CONSULTS
"  Referring Provider: Nara Del Rosario  Reason for Consultation: alc withdrawal      Chief complaint alc withdrawal    Subjective .     History of present illness:  The patient is a 33 y.o. male who was admitted secondary to abd pain. PMHx: alc abuse, hx of opioid dependence, + hep C.   Psych consult was requested by Nara ACUÑA 2ry to alc withdrawal.  The pt reported extensive history of alcohol dependence, history of abuse with dependence with last use 5 years ago.  The patient also stated she has been diagnosed with bipolar disorder, he used to be on medications in the past but not recently.  He started drinking 10+ years ago in order to alleviate symptoms of depression, eventually progressed to daily drinking.  He was in rehab few years ago in [facility] for opioid dependence, he is staying abstinent from opioids, but he substituted opioids for alcohol.  The patient stated he has been \"functional alcoholic \", did not affect significantly his job performance, did not affect his family life.  The patient reported increased tolerance, withdrawal symptoms, every morning he experiences tremor, severe abdominal discomfort, starts drinking at lunch, consumes up to 1/5 of liquor daily.  He stated he has been thinking about quitting alcohol for quite some time, but decided to do this time secondary to deterioration of his physical health.  Patient denied any perceptual disturbances, he denied suicidal and homicidal ideations  Past psychiatric history: Bipolar disorder, alcohol dependence, opioid dependence, no history of suicides, but he had fleeting suicidal ideations in the past        Review of Systems   All systems were reviewed and negative except for:  Constitution:  positive for fatigue  Gastrointestinal: positive for  nausea and pain  Neurological: positive for  dizziness and tremors  Behavioral/Psych: positive for  anxiety and depression    History  Past Medical History: " "  Diagnosis Date    Substance abuse         History reviewed. No pertinent family history.     Social History     Tobacco Use    Smoking status: Every Day     Current packs/day: 1.50     Average packs/day: 1.5 packs/day for 15.0 years (22.5 ttl pk-yrs)     Types: Cigarettes    Smokeless tobacco: Never   Vaping Use    Vaping status: Never Used   Substance Use Topics    Alcohol use: Yes     Alcohol/week: 20.0 standard drinks of alcohol     Types: 20 Drinks containing 0.5 oz of alcohol per week     Comment: every day, last drink 7-29    Drug use: Not Currently     Types: Heroin     Comment: last time, 5 years ago          No medications prior to admission.        Scheduled Meds:  folic acid, 1 mg, Oral, Daily  iopamidol, 100 mL, Intravenous, Once in imaging  multivitamin with minerals, 1 tablet, Oral, Daily  nicotine, 1 patch, Transdermal, Q24H  pantoprazole, 40 mg, Oral, Q AM  thiamine (B-1) IV, 200 mg, Intravenous, Q8H   Followed by  [START ON 8/5/2024] thiamine, 100 mg, Oral, Daily         Continuous Infusions:  sodium chloride, 100 mL/hr, Last Rate: 100 mL/hr (07/30/24 2128)        PRN Meds:    labetalol    LORazepam **OR** LORazepam **OR** LORazepam **OR** LORazepam **OR** LORazepam **OR** LORazepam    Magnesium Standard Dose Replacement - Follow Nurse / BPA Driven Protocol    ondansetron ODT **OR** ondansetron    [COMPLETED] Insert Peripheral IV **AND** sodium chloride    traMADol      Allergies:  Patient has no known allergies.      Objective     Vital Signs   /84 (BP Location: Left arm, Patient Position: Lying)   Pulse 92   Temp 98.1 °F (36.7 °C) (Oral)   Resp 20   Ht 177.8 cm (70\")   Wt 77.3 kg (170 lb 6.7 oz)   SpO2 98%   BMI 24.45 kg/m²     Physical Exam:    Musculoskeletal:   Muscle strength and tone: WNL  Abnormal Movements: none   Gait: unable to assess, the pt was in the wheelchair readyu to be transported to his room      General Appearance:    In NAD         Mental Status Exam:   Hygiene:  " " good  Cooperation:  Cooperative  Eye Contact:  Good  Behavior and Psychomotor Activity: Appropriate  Speech:  Normal  Mood: depressed anxious   Affect:  Blunted and Congruent  Thought Process:  Goal directed, Linear, and Organized  Associations: Intact   Thought Content:   Mood congruent  Language: Appropriate  Suicidal Ideations:  None  Homicidal:  None  Hallucinations:  None  Delusion:  None  Orientation:  To person, Place, Time, and Situation  Memory:  Intact  Concentration and computation: Fair  Attention span: Fair  Fund of knowledge: Appropriate  Reliability:  fair  Insight:  Fair  Judgement:   Limited  Impulse Control:  Poor      Medications and allergies reviewed      Lab Results   Component Value Date    GLUCOSE 113 (H) 07/30/2024    CALCIUM 9.0 07/30/2024     (L) 07/30/2024    K 4.2 07/30/2024    CO2 20.2 (L) 07/30/2024    CL 97 (L) 07/30/2024    BUN 9 07/30/2024    CREATININE 0.76 07/30/2024    BCR 11.8 07/30/2024    ANIONGAP 16.8 (H) 07/30/2024       Last Urine Toxicity          Latest Ref Rng & Units 5/8/2023   LAST URINE TOXICITY RESULTS   Barbiturates Screen, Urine Negative Negative    Benzodiazepine Screen, Urine Negative Negative    Cocaine Screen, Urine Negative Negative    Methadone Screen , Urine Negative Negative       Details                   No results found for: \"PHENYTOIN\", \"PHENOBARB\", \"VALPROATE\", \"CBMZ\"    Lab Results   Component Value Date     (L) 07/30/2024    BUN 9 07/30/2024    CREATININE 0.76 07/30/2024    WBC 6.59 07/30/2024       Brief Urine Lab Results  (Last result in the past 365 days)        Color   Clarity   Blood   Leuk Est   Nitrite   Protein   CREAT   Urine HCG        07/30/24 1621 Yellow   Cloudy   Negative   Negative   Negative   30 mg/dL (1+)               7/30/24 EKG     Assessment & Plan       Abdominal pain    Alcohol withdrawal syndrome without complication    Hyperbilirubinemia    Tobacco use          Assessment: Alcohol use disorder, alcohol " withdrawal, bipolar disorder type II  Treatment Plan: The patient presented with the symptoms of alcohol dependence, alcohol withdrawal.  Remote history of opioid dependence but abstinent for 5 years.  Continue alcohol withdrawal protocol with lorazepam.  Cont thiamine and folic acid  Inpt CD rehab was offered, the pt was not ready to make decision yet   Cont to provide support, will follow   Treatment Plan discussed with: Patient and nursing     I discussed the patients findings and my recommendations with patient and nursing staff    I have reviewed and approved the behavioral health treatment plans and problem list. Yes  Thank you for the consult   Referring MD has access to consult report and progress notes in EMR       This document has been electronically signed by Karrie Valenzuela MD  July 31, 2024 14:13 EDT    Part of this note may be an electronic transcription/translation of spoken language to printed text using the Dragon Dictation System.

## 2024-07-31 NOTE — PROGRESS NOTES
[facility] MEDICINE SERVICE  DAILY PROGRESS NOTE    NAME: Jose Luis Elizabeth  : 1990  MRN: [id number]      LOS: 1 day     PROVIDER OF SERVICE: Jj Harris MD    Chief Complaint: Abdominal pain    Subjective:     Interval History:  History taken from: patient chart  Mentation ok abd pain and nausea controlled     Review of Systems:   Review of Systems  All negative except as above   Objective:     Vital Signs  Temp:  [97.2 °F (36.2 °C)-99.4 °F (37.4 °C)] 98.1 °F (36.7 °C)  Heart Rate:  [] 92  Resp:  [13-22] 20  BP: (110-141)/(66-95) 129/84   Body mass index is 24.45 kg/m².    Physical Exam  Physical Exam  AOx3 anxious  RRR S1 and S2 audible  Lungs with fair air entry  Abdomen soft nontender nondistended  Scheduled Meds   folic acid, 1 mg, Oral, Daily  iopamidol, 100 mL, Intravenous, Once in imaging  multivitamin with minerals, 1 tablet, Oral, Daily  nicotine, 1 patch, Transdermal, Q24H  pantoprazole, 40 mg, Oral, Q AM  thiamine (B-1) IV, 200 mg, Intravenous, Q8H   Followed by  [START ON 2024] thiamine, 100 mg, Oral, Daily       PRN Meds     labetalol    LORazepam **OR** LORazepam **OR** LORazepam **OR** LORazepam **OR** LORazepam **OR** LORazepam    Magnesium Standard Dose Replacement - Follow Nurse / BPA Driven Protocol    ondansetron ODT **OR** ondansetron    [COMPLETED] Insert Peripheral IV **AND** sodium chloride    traMADol   Infusions  sodium chloride, 100 mL/hr, Last Rate: 100 mL/hr (24)          Diagnostic Data    Results from last 7 days   Lab Units 24  2359   WBC 10*3/mm3 6.59   HEMOGLOBIN g/dL 14.7   HEMATOCRIT % 42.3   PLATELETS 10*3/mm3 155   GLUCOSE mg/dL 113*   CREATININE mg/dL 0.76   BUN mg/dL 9   SODIUM mmol/L 134*   POTASSIUM mmol/L 4.2   AST (SGOT) U/L 594*   ALT (SGPT) U/L 315*   ALK PHOS U/L 110   BILIRUBIN mg/dL 1.0   ANION GAP mmol/L 16.8*       CT Abdomen Pelvis With Contrast    Result Date: 2024  Impression: No acute findings in  the abdomen/pelvis. Hepatic steatosis with mild hepatomegaly. Electronically Signed: Cholo Quintana  7/30/2024 6:18 PM EDT  Workstation ID: [id number]       I reviewed the patient's new clinical results.  I reviewed the patient's new imaging results and agree with the interpretation.    Assessment/Plan:     Active and Resolved Problems  Active Hospital Problems    Diagnosis  POA    **Abdominal pain [R10.9]  Yes    Alcohol withdrawal syndrome without complication [F10.930]  Yes    Hyperbilirubinemia [E80.6]  Yes    Tobacco use [Z72.0]  Yes      Resolved Hospital Problems   No resolved problems to display.       33-year-old male with history of EtOH abuse, tobacco abuse former IVDU admitted to [facility] 7/30 with abdominal pain associated with nausea and vomiting    #EtOH intoxication complicated by withdrawal  Daily drinker  Continue CIWA monitoring per protocol  IV thiamine along with folic acid and MVI  Counseled on EtOH cessation  Will benefit from drug rehab on discharge  Psych consulted on admission    #Tobacco abuse  Continue NRT    #Elevated LFTs attributed to alcoholic hepatitis  GI consulted on admission  Adrián DF score low will hold off on steroids  Trend LFTs    VTE Prophylaxis:  Mechanical VTE prophylaxis orders are present.         Code status is   Code Status and Medical Interventions: CPR (Attempt to Resuscitate); Full Support   Ordered at: 07/30/24 2000     Code Status (Patient has no pulse and is not breathing):    CPR (Attempt to Resuscitate)     Medical Interventions (Patient has pulse or is breathing):    Full Support       Plan for disposition: 1 to 2 days    Time: 30 minutes    Signature: Electronically signed by Jj Harris MD, 07/31/24, 13:22 EDT.  [facility] Hospitalist Team

## 2024-07-31 NOTE — PLAN OF CARE
Goal Outcome Evaluation:      Pt condition ongoing. Pt safety precautions maintained.

## 2024-08-01 ENCOUNTER — INPATIENT HOSPITAL (OUTPATIENT)
Dept: URBAN - METROPOLITAN AREA HOSPITAL 84 | Facility: HOSPITAL | Age: 34
End: 2024-08-01
Payer: COMMERCIAL

## 2024-08-01 DIAGNOSIS — F19.90 OTHER PSYCHOACTIVE SUBSTANCE USE, UNSPECIFIED, UNCOMPLICATED: ICD-10-CM

## 2024-08-01 DIAGNOSIS — F10.10 ALCOHOL ABUSE, UNCOMPLICATED: ICD-10-CM

## 2024-08-01 DIAGNOSIS — R74.01 ELEVATION OF LEVELS OF LIVER TRANSAMINASE LEVELS: ICD-10-CM

## 2024-08-01 LAB
ALBUMIN SERPL-MCNC: 3.9 G/DL (ref 3.5–5.2)
ALBUMIN/GLOB SERPL: 2.2 G/DL
ALP SERPL-CCNC: 95 U/L (ref 39–117)
ALT SERPL W P-5'-P-CCNC: 212 U/L (ref 1–41)
ANION GAP SERPL CALCULATED.3IONS-SCNC: 11.1 MMOL/L (ref 5–15)
AST SERPL-CCNC: 200 U/L (ref 1–40)
BASOPHILS # BLD AUTO: 0.02 10*3/MM3 (ref 0–0.2)
BASOPHILS NFR BLD AUTO: 0.4 % (ref 0–1.5)
BILIRUB SERPL-MCNC: 1.4 MG/DL (ref 0–1.2)
BUN SERPL-MCNC: 11 MG/DL (ref 6–20)
BUN/CREAT SERPL: 14.1 (ref 7–25)
CALCIUM SPEC-SCNC: 8.8 MG/DL (ref 8.6–10.5)
CHLORIDE SERPL-SCNC: 98 MMOL/L (ref 98–107)
CO2 SERPL-SCNC: 26.9 MMOL/L (ref 22–29)
CREAT SERPL-MCNC: 0.78 MG/DL (ref 0.76–1.27)
DEPRECATED RDW RBC AUTO: 39.8 FL (ref 37–54)
EGFRCR SERPLBLD CKD-EPI 2021: 121.5 ML/MIN/1.73
EOSINOPHIL # BLD AUTO: 0.04 10*3/MM3 (ref 0–0.4)
EOSINOPHIL NFR BLD AUTO: 0.8 % (ref 0.3–6.2)
ERYTHROCYTE [DISTWIDTH] IN BLOOD BY AUTOMATED COUNT: 11 % (ref 12.3–15.4)
FERRITIN SERPL-MCNC: 3310 NG/ML (ref 30–400)
GLOBULIN UR ELPH-MCNC: 1.8 GM/DL
GLUCOSE SERPL-MCNC: 76 MG/DL (ref 65–99)
HCT VFR BLD AUTO: 39 % (ref 37.5–51)
HGB BLD-MCNC: 13.9 G/DL (ref 13–17.7)
IMM GRANULOCYTES # BLD AUTO: 0 10*3/MM3 (ref 0–0.05)
IMM GRANULOCYTES NFR BLD AUTO: 0 % (ref 0–0.5)
IRON 24H UR-MRATE: 178 MCG/DL (ref 59–158)
IRON SATN MFR SERPL: 68 % (ref 20–50)
LKM-1 AB SER-ACNC: 1.1 UNITS (ref 0–20)
LYMPHOCYTES # BLD AUTO: 1.15 10*3/MM3 (ref 0.7–3.1)
LYMPHOCYTES NFR BLD AUTO: 24 % (ref 19.6–45.3)
MAGNESIUM SERPL-MCNC: 1.8 MG/DL (ref 1.6–2.6)
MCH RBC QN AUTO: 34.8 PG (ref 26.6–33)
MCHC RBC AUTO-ENTMCNC: 35.6 G/DL (ref 31.5–35.7)
MCV RBC AUTO: 97.5 FL (ref 79–97)
MITOCHONDRIA M2 IGG SER-ACNC: <20 UNITS (ref 0–20)
MONOCYTES # BLD AUTO: 0.46 10*3/MM3 (ref 0.1–0.9)
MONOCYTES NFR BLD AUTO: 9.6 % (ref 5–12)
NEUTROPHILS NFR BLD AUTO: 3.12 10*3/MM3 (ref 1.7–7)
NEUTROPHILS NFR BLD AUTO: 65.2 % (ref 42.7–76)
NRBC BLD AUTO-RTO: 0 /100 WBC (ref 0–0.2)
PHOSPHATE SERPL-MCNC: 2.6 MG/DL (ref 2.5–4.5)
PLATELET # BLD AUTO: 137 10*3/MM3 (ref 140–450)
PMV BLD AUTO: 10.1 FL (ref 6–12)
POTASSIUM SERPL-SCNC: 4 MMOL/L (ref 3.5–5.2)
PROT SERPL-MCNC: 5.7 G/DL (ref 6–8.5)
QT INTERVAL: 343 MS
QTC INTERVAL: 470 MS
RBC # BLD AUTO: 4 10*6/MM3 (ref 4.14–5.8)
SMA IGG SER-ACNC: 8 UNITS (ref 0–19)
SODIUM SERPL-SCNC: 136 MMOL/L (ref 136–145)
TIBC SERPL-MCNC: 261 MCG/DL (ref 298–536)
TRANSFERRIN SERPL-MCNC: 175 MG/DL (ref 200–360)
WBC NRBC COR # BLD AUTO: 4.79 10*3/MM3 (ref 3.4–10.8)

## 2024-08-01 PROCEDURE — 81050 URINALYSIS VOLUME MEASURE: CPT | Performed by: INTERNAL MEDICINE

## 2024-08-01 PROCEDURE — 80053 COMPREHEN METABOLIC PANEL: CPT | Performed by: HOSPITALIST

## 2024-08-01 PROCEDURE — 25010000002 LORAZEPAM PER 2 MG: Performed by: NURSE PRACTITIONER

## 2024-08-01 PROCEDURE — 84466 ASSAY OF TRANSFERRIN: CPT | Performed by: NURSE PRACTITIONER

## 2024-08-01 PROCEDURE — 87522 HEPATITIS C REVRS TRNSCRPJ: CPT | Performed by: NURSE PRACTITIONER

## 2024-08-01 PROCEDURE — 82525 ASSAY OF COPPER: CPT | Performed by: INTERNAL MEDICINE

## 2024-08-01 PROCEDURE — 25810000003 SODIUM CHLORIDE 0.9 % SOLUTION: Performed by: NURSE PRACTITIONER

## 2024-08-01 PROCEDURE — 83735 ASSAY OF MAGNESIUM: CPT | Performed by: HOSPITALIST

## 2024-08-01 PROCEDURE — 82570 ASSAY OF URINE CREATININE: CPT | Performed by: INTERNAL MEDICINE

## 2024-08-01 PROCEDURE — 99232 SBSQ HOSP IP/OBS MODERATE 35: CPT | Performed by: NURSE PRACTITIONER

## 2024-08-01 PROCEDURE — 83540 ASSAY OF IRON: CPT | Performed by: NURSE PRACTITIONER

## 2024-08-01 PROCEDURE — 87902 NFCT AGT GNTYP ALYS HEP C: CPT | Performed by: NURSE PRACTITIONER

## 2024-08-01 PROCEDURE — 84100 ASSAY OF PHOSPHORUS: CPT | Performed by: HOSPITALIST

## 2024-08-01 PROCEDURE — 85025 COMPLETE CBC W/AUTO DIFF WBC: CPT | Performed by: HOSPITALIST

## 2024-08-01 PROCEDURE — 25010000002 THIAMINE PER 100 MG: Performed by: NURSE PRACTITIONER

## 2024-08-01 RX ADMIN — THIAMINE HYDROCHLORIDE 200 MG: 100 INJECTION, SOLUTION INTRAMUSCULAR; INTRAVENOUS at 05:30

## 2024-08-01 RX ADMIN — SODIUM CHLORIDE 100 ML/HR: 9 INJECTION, SOLUTION INTRAVENOUS at 03:22

## 2024-08-01 RX ADMIN — LORAZEPAM 2 MG: 2 INJECTION INTRAMUSCULAR; INTRAVENOUS at 15:31

## 2024-08-01 RX ADMIN — Medication 1 TABLET: at 09:03

## 2024-08-01 RX ADMIN — Medication 10 ML: at 09:03

## 2024-08-01 RX ADMIN — LORAZEPAM 2 MG: 2 INJECTION INTRAMUSCULAR; INTRAVENOUS at 09:03

## 2024-08-01 RX ADMIN — LORAZEPAM 2 MG: 2 INJECTION INTRAMUSCULAR; INTRAVENOUS at 04:42

## 2024-08-01 RX ADMIN — SODIUM CHLORIDE 100 ML/HR: 9 INJECTION, SOLUTION INTRAVENOUS at 23:27

## 2024-08-01 RX ADMIN — FOLIC ACID 1 MG: 1 TABLET ORAL at 09:03

## 2024-08-01 RX ADMIN — LORAZEPAM 1 MG: 2 INJECTION INTRAMUSCULAR; INTRAVENOUS at 01:15

## 2024-08-01 RX ADMIN — THIAMINE HYDROCHLORIDE 200 MG: 100 INJECTION, SOLUTION INTRAMUSCULAR; INTRAVENOUS at 22:16

## 2024-08-01 RX ADMIN — THIAMINE HYDROCHLORIDE 200 MG: 100 INJECTION, SOLUTION INTRAMUSCULAR; INTRAVENOUS at 15:30

## 2024-08-01 RX ADMIN — SODIUM CHLORIDE 100 ML/HR: 9 INJECTION, SOLUTION INTRAVENOUS at 13:47

## 2024-08-01 RX ADMIN — PANTOPRAZOLE SODIUM 40 MG: 40 TABLET, DELAYED RELEASE ORAL at 05:29

## 2024-08-01 RX ADMIN — NICOTINE 1 PATCH: 21 PATCH TRANSDERMAL at 09:04

## 2024-08-01 NOTE — PLAN OF CARE
Goal Outcome Evaluation:      Pt having anxiety and agitation throughout the day. Pt still scoring on the CIWA and nurse administered Ativan as need. Pt cooperative. Pt safety precautions maintained.

## 2024-08-01 NOTE — PROGRESS NOTES
" LOS: 0 days   Patient Care Team:  Provider, No Known as PCP - General      Subjective \"Doing better this morning\"    Interval History:     Subjective: He reports night terrors last night.  No nausea or vomiting, tolerating diet.  No nausea or vomiting.  No abdominal pain.    ROS:   No chest pain, shortness of breath, or cough.      Medication Review:     Current Facility-Administered Medications:     folic acid (FOLVITE) tablet 1 mg, 1 mg, Oral, Daily, Nara Del Rosario APRN, 1 mg at 08/01/24 0903    iopamidol (ISOVUE-370) 76 % injection 100 mL, 100 mL, Intravenous, Once in imaging, Mahsa Mac APRN    labetalol (NORMODYNE,TRANDATE) injection 10 mg, 10 mg, Intravenous, Q6H PRN, Nara Del Rosario APRN    LORazepam (ATIVAN) tablet 1 mg, 1 mg, Oral, Q1H PRN, 1 mg at 07/31/24 1316 **OR** LORazepam (ATIVAN) injection 1 mg, 1 mg, Intravenous, Q1H PRN, 1 mg at 08/01/24 0115 **OR** LORazepam (ATIVAN) tablet 2 mg, 2 mg, Oral, Q1H PRN **OR** LORazepam (ATIVAN) injection 2 mg, 2 mg, Intravenous, Q1H PRN, 2 mg at 08/01/24 0903 **OR** LORazepam (ATIVAN) injection 2 mg, 2 mg, Intravenous, Q15 Min PRN, 2 mg at 08/01/24 0442 **OR** LORazepam (ATIVAN) injection 2 mg, 2 mg, Intramuscular, Q15 Min PRN, Nara Del Rosario APRN    Magnesium Standard Dose Replacement - Follow Nurse / BPA Driven Protocol, , Does not apply, PRN, Nara Del Rosario APRN    multivitamin with minerals 1 tablet, 1 tablet, Oral, Daily, Nara Del Rosario APRN, 1 tablet at 08/01/24 0903    nicotine (NICODERM CQ) 21 MG/24HR patch 1 patch, 1 patch, Transdermal, Q24H, Nara Del Rosario APRN, 1 patch at 08/01/24 0904    ondansetron ODT (ZOFRAN-ODT) disintegrating tablet 4 mg, 4 mg, Oral, Q6H PRN **OR** ondansetron (ZOFRAN) injection 4 mg, 4 mg, Intravenous, Q6H PRN, Nara Del Rosario APRN    pantoprazole (PROTONIX) EC tablet 40 mg, 40 mg, Oral, Q AM, Nara Del Rosario APRN, 40 mg at 08/01/24 0529    [COMPLETED] Insert " Peripheral IV, , , Once **AND** sodium chloride 0.9 % flush 10 mL, 10 mL, Intravenous, PRN, Mahsa Mac APRN, 10 mL at 08/01/24 0903    sodium chloride 0.9 % infusion, 100 mL/hr, Intravenous, Continuous, Nara Del Rosario APRN, Last Rate: 100 mL/hr at 08/01/24 0322, 100 mL/hr at 08/01/24 0322    thiamine (B-1) injection 200 mg, 200 mg, Intravenous, Q8H, 200 mg at 08/01/24 0530 **FOLLOWED BY** [START ON 8/5/2024] thiamine (VITAMIN B-1) tablet 100 mg, 100 mg, Oral, Daily, Nara Del Rosario APRN    traMADol (ULTRAM) tablet 50 mg, 50 mg, Oral, Q6H PRN, Jj Harris MD, 50 mg at 07/31/24 1501      Objective ambulating in room, NAD    Vital Signs  Vitals:    07/31/24 2004 07/31/24 2350 08/01/24 0317 08/01/24 0320   BP: 135/91 132/84 138/93    BP Location: Left arm Left arm Left arm    Patient Position: Lying Lying Lying    Pulse: 100 87 79    Resp: 19 16 18    Temp: 97.9 °F (36.6 °C) 97.4 °F (36.3 °C) 97.5 °F (36.4 °C)    TempSrc: Oral Oral Oral    SpO2: 95% 99% 99%    Weight:    82.7 kg (182 lb 5.1 oz)   Height:           Physical Exam:     General Appearance:    Awake and alert, in no acute distress   Head:    Normocephalic, without obvious abnormality   Eyes:          Conjunctivae normal, anicteric sclera   Throat:   No oral lesions, no thrush, oral mucosa moist   Neck:   supple, no JVD   Lungs:     respirations regular, even and unlabored   Abdomen:     Soft, nondistended   Rectal:     Deferred   Extremities:   No edema, no cyanosis   Skin:   No bruising or rash, no jaundice        Results Review:    CBC    Results from last 7 days   Lab Units 08/01/24  0213 07/30/24  2359 07/30/24  1602   WBC 10*3/mm3 4.79 6.59 10.25   HEMOGLOBIN g/dL 13.9 14.7 17.2   PLATELETS 10*3/mm3 137* 155 203     CMP   Results from last 7 days   Lab Units 08/01/24  0213 07/30/24  2359 07/30/24 2005 07/30/24  1602   SODIUM mmol/L 136 134*  --  137   POTASSIUM mmol/L 4.0 4.2  --  4.5   CHLORIDE mmol/L 98 97*  --  91*   CO2  "mmol/L 26.9 20.2*  --  12.6*   BUN mg/dL 11 9  --  11   CREATININE mg/dL 0.78 0.76  --  0.94   GLUCOSE mg/dL 76 113*  --  57*   ALBUMIN g/dL 3.9 4.0  --  4.8   BILIRUBIN mg/dL 1.4* 1.0  --  2.6*   ALK PHOS U/L 95 110  --  143*   AST (SGOT) U/L 200* 594*  --  1,175*   ALT (SGPT) U/L 212* 315*  --  382*   MAGNESIUM mg/dL 1.8 1.6  --  1.7   PHOSPHORUS mg/dL 2.6  --   --   --    LIPASE U/L  --   --   --  41   AMMONIA umol/L  --   --  26  --      Cr Clearance Estimated Creatinine Clearance: 159 mL/min (by C-G formula based on SCr of 0.78 mg/dL).  Coag   Results from last 7 days   Lab Units 07/31/24  0955   INR  1.07     HbA1C No results found for: \"HGBA1C\"      Infection     UA    Results from last 7 days   Lab Units 07/30/24  1621   NITRITE UA  Negative   WBC UA /HPF 0-2   BACTERIA UA /HPF None Seen   SQUAM EPITHEL UA /HPF 0-2     Microbiology Results (last 10 days)       ** No results found for the last 240 hours. **          Imaging Results (Last 72 Hours)       Procedure Component Value Units Date/Time    CT Abdomen Pelvis With Contrast [[id number]] Collected: 07/30/24 1809     Updated: 07/30/24 1820    Narrative:      CT ABDOMEN PELVIS W CONTRAST    Date of Exam: 7/30/2024 5:19 PM EDT    Indication: abd pain/vomiting/elvated lfts.    Comparison: CT abdomen/pelvis 5/8/2023.    Technique: Axial CT images were obtained of the abdomen and pelvis following the uneventful intravenous administration of iodinated contrast. Sagittal and coronal reconstructions were performed.  Automated exposure control and iterative reconstruction   methods were used.        Findings:  Lung Bases: No significant abnormality.    Liver: Hepatomegaly measuring 19 mm. Hepatic steatosis.     Gallbladder and biliary tree: No significant abnormality.     Spleen: No significant abnormality.     Pancreas: No significant abnormality.     Adrenal glands: No significant abnormality.     Kidneys and ureters: No significant abnormality.     Stomach and " duodenum:No significant abnormality.    Small and large bowel: No evidence of obstruction. Normal appendix.    Peritoneal cavity: No free fluid or free air.    Bladder: No significant abnormality.     Pelvic organs: No significant abnormality.     Vasculature: No significant abnormality.     Lymph nodes: No pathologic appearing lymph nodes by imaging criteria.     Bones and soft tissues: No significant abnormality.      Impression:      Impression:  No acute findings in the abdomen/pelvis.    Hepatic steatosis with mild hepatomegaly.            Electronically Signed: Cholo Quintana    7/30/2024 6:18 PM EDT    Workstation ID: [id number]            Assessment & Plan   Elevated LFTs -likely alcohol hepatitis - improved  Alcohol abuse  History of drug abuse     PLAN:  Patient is a 33-year-old male with a history of drug abuse and daily alcohol abuse who presents with increased weakness and nausea with vomiting.  He is overall feeling better today.  He does report a history of withdrawal symptoms.     Total bilirubin 1.4, alk phos 95, , , WBC 4.7, hemoglobin 13.9, platelets 137.  Iron 178, AFP 6.6, HCVRNA positive, full serology workup pending    EtOH level 0.209 upon admission. Feeling better today.  Psychiatry following for withdrawal.  Alcohol cessation strongly recommended.  Okay for diet as tolerated.  Full liver workup in progress including 24-hour urine copper and hemochromatosis gene.  HCV RNA pending.  Little more for GI to offer at this time, call with questions or concerns.  We will see inpatient as needed.  He will follow-up with us as an outpatient for failure of their management of his liver disease.      Electronically signed by DOMENICO Gilman, 08/01/24, 9:56 AM EDT.

## 2024-08-01 NOTE — PROGRESS NOTES
"  Chief complaint \"I feel much better today.\"    Subjective .     History of present illness:  The patient is a 33 y.o. male who was admitted secondary to abd pain. PMHx: alc abuse, hx of opioid dependence, + hep C.   Psych consult was requested by Nara ACUÑA secondary to alc withdrawal.  The pt reported extensive history of alcohol dependence, history of abuse with dependence with last use 5 years ago.  The patient also stated he has been diagnosed with bipolar disorder, he used to be on medications in the past but not recently.  He started drinking 10+ years ago in order to alleviate symptoms of depression, eventually progressed to daily drinking.  He was in rehab a few years ago in [facility] for opioid dependence, he is staying abstinent from opioids, but he substituted opioids for alcohol.  The patient stated he has been a \"functional alcoholic \", did not significantly affect his job performance, did not affect his family life.    The patient reported increased tolerance, withdrawal symptoms, every morning he experiences tremor, severe abdominal discomfort, starts drinking at lunch, consumes up to 1/5 of liquor daily.  He stated he has been thinking about quitting alcohol for quite some time, but decided to do this time secondary to deterioration of his physical health.  Patient denied any perceptual disturbances, he denied suicidal and homicidal ideations  Past psychiatric history: Bipolar disorder, alcohol dependence, opioid dependence, no history of suicides, but he had fleeting suicidal ideations in the past      Today 8/1/24  Patient was pleasant and resting in bed, appeared comfortable   Reports feeling \"a lot better\" today, \"jitters\" and irritability have significantly subsided.   Discussed medication management and the option for inpatient rehab   Patient declined these services at this time stating that he has done them before in the past and does not find much benefit from " ACM made no outreach to patient d/t being active with CTN at this time. ACM will resume follow up calls once patient CTN episode is completed. "them  States he got sober this time because he \"truly wanted to\" and has social support at home.   Patient did express interest in outpatient therapy and wanting to establish better coping skills.      The following portions of the patient's history were reviewed and updated as appropriate: allergies, current medications, past family history, past medical history, past social history, past surgical history and problem list.    History    Objective     Vital Signs   /73 (BP Location: Left arm, Patient Position: Lying)   Pulse 77   Temp 98 °F (36.7 °C) (Oral)   Resp 16   Ht 177.8 cm (70\")   Wt 82.7 kg (182 lb 5.1 oz)   SpO2 98%   BMI 26.16 kg/m²         MENTAL STATUS EXAM   General Appearance:  Cleanly groomed and dressed  Eye Contact:  Good eye contact  Attitude:  Cooperative and polite  Motor Activity:  Normal gait, posture  Muscle Strength:  Normal  Speech:  Normal rate, tone, volume  Language:  Spontaneous  Mood and affect:  Normal, pleasant  Hopelessness:  Denies  Loneliness: Denies  Thought Process:  Logical and goal-directed  Associations/ Thought Content:  No delusions  Hallucinations:  None  Suicidal Ideations:  Not present  Homicidal Ideation:  Not present  Sensorium:  Alert and clear  Orientation:  Person, place, time and situation  Immediate Recall, Recent, and Remote Memory:  Intact  Attention Span/ Concentration:  Good  Fund of Knowledge:  Appropriate for age and educational level  Intellectual Functioning:  Average range  Insight:  Fair  Judgement:  Fair  Reliability:  Fair  Impulse Control:  Fair         Assessment & Plan       Abdominal pain    Alcohol withdrawal syndrome without complication    Hyperbilirubinemia    Tobacco use       Assessment:  Alcohol use disorder, alcohol withdrawal, bipolar disorder type II   Treatment Plan: The patient presented with the symptoms of alcohol dependence, alcohol withdrawal.  Remote history of opioid dependence but abstinent for 5 years.  Continue " alcohol withdrawal protocol with Ativan.  Continue thiamine and folic acid.  Inpatient CD rehab was offered, the patient declined again today   Patient requested outpatient therapy, given information for psychotherapist at [facility]. [phone]  Will continue to follow as needed   Treatment Plan discussed with: Patient    I discussed the patients findings and my recommendations with patient and nursing staff    I have reviewed and approved the behavioral health treatment plans and problem list. Yes  Thank you for the consult   Referring MD has access to consult report and progress notes in EMR     KARO Tong Student  08/01/24  12:14 EDT

## 2024-08-01 NOTE — PLAN OF CARE
Problem: Pain Acute  Goal: Acceptable Pain Control and Functional Ability  Intervention: Prevent or Manage Pain  Recent Flowsheet Documentation  Taken 8/1/2024 0400 by Callum Lujan RN  Medication Review/Management: medications reviewed  Taken 8/1/2024 0000 by Callum Lujan RN  Medication Review/Management: medications reviewed  Taken 7/31/2024 2000 by Callum Lujan RN  Medication Review/Management: medications reviewed  Intervention: Optimize Psychosocial Wellbeing  Recent Flowsheet Documentation  Taken 7/31/2024 2000 by Callum Lujan RN  Supportive Measures: active listening utilized  Diversional Activities: television   Goal Outcome Evaluation:      Patient has been scoring high on the CIWA scale patient has been receiving IV Ativan as needed. Patient has been anxious, and cooperative with care.

## 2024-08-01 NOTE — PROGRESS NOTES
[facility] MEDICINE SERVICE  DAILY PROGRESS NOTE    NAME: Jose Luis Elizabeth  : 1990  MRN: [id number]      LOS: 0 days     PROVIDER OF SERVICE: Brandon Morales MD    Chief Complaint: Abdominal pain    Subjective:     Interval History:  History taken from: patient  Patient Complaints: Mild tremor otherwise feeling better  Patient Denies: Nausea or vomiting    Review of Systems:   Review of Systems  14 point review of system unremarkable except mentioned above  Objective:     Vital Signs  Temp:  [97.4 °F (36.3 °C)-98 °F (36.7 °C)] 98 °F (36.7 °C)  Heart Rate:  [] 77  Resp:  [16-19] 16  BP: (117-138)/(73-93) 117/73   Body mass index is 26.16 kg/m².    Physical Exam  Physical Exam  HENT:      Head: Atraumatic.      Mouth/Throat:      Mouth: Mucous membranes are moist.   Eyes:      Pupils: Pupils are equal, round, and reactive to light.   Cardiovascular:      Rate and Rhythm: Normal rate and regular rhythm.   Pulmonary:      Effort: Pulmonary effort is normal.      Breath sounds: Normal breath sounds.   Abdominal:      General: Bowel sounds are normal.      Palpations: Abdomen is soft.   Musculoskeletal:         General: Normal range of motion.      Cervical back: Neck supple.   Skin:     General: Skin is warm.   Neurological:      General: No focal deficit present.      Mental Status: He is alert and oriented to person, place, and time.   Psychiatric:         Mood and Affect: Mood normal.         Scheduled Meds   folic acid, 1 mg, Oral, Daily  iopamidol, 100 mL, Intravenous, Once in imaging  multivitamin with minerals, 1 tablet, Oral, Daily  nicotine, 1 patch, Transdermal, Q24H  pantoprazole, 40 mg, Oral, Q AM  thiamine (B-1) IV, 200 mg, Intravenous, Q8H   Followed by  [START ON 2024] thiamine, 100 mg, Oral, Daily       PRN Meds     labetalol    LORazepam **OR** LORazepam **OR** LORazepam **OR** LORazepam **OR** LORazepam **OR** LORazepam    Magnesium Standard Dose Replacement - Follow  Nurse / BPA Driven Protocol    ondansetron ODT **OR** ondansetron    [COMPLETED] Insert Peripheral IV **AND** sodium chloride    traMADol   Infusions  sodium chloride, 100 mL/hr, Last Rate: 100 mL/hr (08/01/24 1347)          Diagnostic Data    Results from last 7 days   Lab Units 08/01/24  0213   WBC 10*3/mm3 4.79   HEMOGLOBIN g/dL 13.9   HEMATOCRIT % 39.0   PLATELETS 10*3/mm3 137*   GLUCOSE mg/dL 76   CREATININE mg/dL 0.78   BUN mg/dL 11   SODIUM mmol/L 136   POTASSIUM mmol/L 4.0   AST (SGOT) U/L 200*   ALT (SGPT) U/L 212*   ALK PHOS U/L 95   BILIRUBIN mg/dL 1.4*   ANION GAP mmol/L 11.1       CT Abdomen Pelvis With Contrast    Result Date: 7/30/2024  Impression: No acute findings in the abdomen/pelvis. Hepatic steatosis with mild hepatomegaly. Electronically Signed: Cholo Quintana  7/30/2024 6:18 PM EDT  Workstation ID: [id number]       I reviewed the patient's new clinical results.    Assessment/Plan:     Active and Resolved Problems  #EtOH intoxication   alcholl  withdrawal syndrome   -consumes etoh daily   Continue CIWA monitoring per protocol  IV thiamine along with folic acid and MVI  Counseled on EtOH cessation  Will benefit from drug rehab on discharge  Psych consulted on admission     #Tobacco abuse  Continue NRT     #Elevated LFTs attributed to alcoholic hepatitis  GI consulted on admission  Adrián DF score low will hold off on steroids  Trend LFTs      VTE Prophylaxis:  Mechanical VTE prophylaxis orders are present.         Code status is   Code Status and Medical Interventions: CPR (Attempt to Resuscitate); Full Support   Ordered at: 07/30/24 2000     Code Status (Patient has no pulse and is not breathing):    CPR (Attempt to Resuscitate)     Medical Interventions (Patient has pulse or is breathing):    Full Support       Plan for disposition: home  in 1-2  days    Time: 35 minutes    Signature: Electronically signed by Brandon Morales MD, 08/01/24, 14:56 EDT.  [facility] Hospitalist Team

## 2024-08-01 NOTE — PAYOR COMM NOTE
"OBSERVATION TO INPATIENT CASE    08/01/24 1017  Inpatient Admission  Once     Completed     Level of Care: Med/Surg  Diagnosis: Abdominal pain [273672]  Attending Physician: KERI LIVINGSTON [[id number]]  Certification: I Certify That Inpatient Hospital Services Are Medically Necessary For Greater Than 2 Midnights          Fara HENDRICKS, RN    Care Coordination   Utilization Review  [address] [address] [phone] office  [phone] fax  [email or web address]    [facility] 8/1 @ 0830      [facility] 8/1 @ 0400        Jose Luis Ryan (33 y.o. Male)       Date of Birth   1990    Social Security Number       Address   [address] [place] [address] [place] [address]    Home Phone   [phone]    MRN   [id number]       Jain   Orthodox    Marital Status                               Admission Date   7/30/24    Admission Type   Emergency    Admitting Provider   Jj Harris MD    Attending Provider   Keri Livingston MD    Department, Room/Bed   [facility] 2D, 270/1       Discharge Date       Discharge Disposition       Discharge Destination                                 Attending Provider: Keri Livingston MD    Allergies: No Known Allergies    Isolation: None   Infection: None   Code Status: CPR    Ht: 177.8 cm (70\")   Wt: 82.7 kg (182 lb 5.1 oz)    Admission Cmt: None   Principal Problem: Abdominal pain [R10.9]                   Active Insurance as of 7/30/2024       Primary Coverage       Payor Plan Insurance Group Employer/Plan Group    ANTHEM BLUE CROSS ANTHEM BLUE CROSS BLUE SHIELD PPO [id number]       Payor Plan Address Payor Plan Phone Number Payor Plan Fax Number Effective Dates    [address] [phone]  5/8/2023 - None Entered    [facility] [address]         Subscriber Name Subscriber Birth Date Member ID       JOSE LUIS RYAN 1990 [id number]                     Emergency Contacts       "  (Rel.) Home Phone Work Phone Mobile Phone    ROBI GONZALEZ (Mother) -- -- [phone]                 History & Physical        Essing-Nara Chamberlain APRN at 24       Attestation signed by Tiana Zamora MD at 24 [phone]    I have reviewed this documentation and agree.                      [facility]  History & Physical    Patient Name: Jose Luis Elizabeth  : 1990  MRN: [id number]  Primary Care Physician:  Provider, No Known  Date of admission: 2024  Date and Time of Service: 2024 at 2015    Subjective      Chief Complaint: abdominal pain     History of Present Illness: Jose Luis Elizabeth is a 33 y.o. male with a CMH of alcohol abuse reports 1/5 of liquor daily, tobacco abuse, former IV drug use positive for hep C in past who presented to [facility] on 2024 with complaints of upper abdominal pain nausea and vomiting.  He denies any fever, hematemesis, hematochezia or melena.  He reports he feels rather drowsy today and gets shaky and anxious to be goes longer than 2 to 3 hours without alcohol.  He is awake and answering appropriately.    Labs today show alk phos 143, AST 1175  total bilirubin 2.6.  Review of records shows he was seen in the emergency department in May 2023 with similar complaints and at that time AST was 147  and bilirubin 0.9.  Ethanol level today is 0.209.  EKG shows sinus tachycardia no ischemic changes acutely, CT abdomen pelvis with contrast per radiology shows no acute findings in the abdomen pelvis hepatic steatosis with mild hepatomegaly.  He was given a banana bag in the emergency room folic acid and B1 injection as well as 1 mg lorazepam for tremors.  He was given IV Zofran and Pepcid in the emergency department.  CIWA precautions have been put in place, case management psychiatry consulted.  Gastroenterology consulted.  Review of Systems   Constitutional: Negative.    HENT:  Negative.     Eyes: Negative.    Respiratory: Negative.     Cardiovascular: Negative.    Gastrointestinal:  Positive for abdominal pain.   Endocrine: Negative.    Genitourinary: Negative.    Musculoskeletal: Negative.    Skin: Negative.    Allergic/Immunologic: Negative.    Neurological: Negative.    Hematological: Negative.    Psychiatric/Behavioral:  Positive for behavioral problems.    All other systems reviewed and are negative.      Personal History     Past Medical History:   Diagnosis Date    Substance abuse        History reviewed. No pertinent surgical history.    Family History: family history is not on file. Otherwise pertinent FHx was reviewed and not pertinent to current issue.    Social History:  reports that he has been smoking cigarettes. He has a 22.5 pack-year smoking history. He has never used smokeless tobacco. He reports current alcohol use of about 20.0 standard drinks of alcohol per week. He reports that he does not currently use drugs after having used the following drugs: Heroin.    Home Medications:  Prior to Admission Medications       Prescriptions Last Dose Informant Patient Reported? Taking?    azithromycin (ZITHROMAX) 250 MG tablet   No No    Take 2 tablets the first day, then 1 tablet daily for 4 days.    fluticasone (FLONASE) 50 MCG/ACT nasal spray   No No    2 sprays into the nostril(s) as directed by provider Daily for 14 days.    ondansetron ODT (ZOFRAN-ODT) 4 MG disintegrating tablet   No No    Place 1 tablet on the tongue Every 8 (Eight) Hours As Needed for Nausea.    pantoprazole (PROTONIX) 40 MG EC tablet   No No    Take 1 tablet by mouth Daily.              Allergies:  No Known Allergies    Objective      Vitals:   Temp:  [97.2 °F (36.2 °C)-98.1 °F (36.7 °C)] 98.1 °F (36.7 °C)  Heart Rate:  [] 103  Resp:  [16-22] 16  BP: (116-141)/(73-95) 121/83  Body mass index is 24.45 kg/m².  Physical Exam  Vitals reviewed.   Constitutional:       Appearance: Normal appearance. He is  normal weight.   HENT:      Head: Normocephalic and atraumatic.      Right Ear: External ear normal.      Left Ear: External ear normal.      Nose: Nose normal.      Mouth/Throat:      Mouth: Mucous membranes are moist.   Eyes:      Extraocular Movements: Extraocular movements intact.   Cardiovascular:      Rate and Rhythm: Regular rhythm. Tachycardia present.      Pulses: Normal pulses.      Heart sounds: Normal heart sounds.   Pulmonary:      Effort: Pulmonary effort is normal.      Breath sounds: Normal breath sounds.   Abdominal:      Palpations: Abdomen is soft.   Genitourinary:     Comments: deferred  Musculoskeletal:         General: Normal range of motion.      Cervical back: Normal range of motion and neck supple.   Skin:     General: Skin is warm and dry.   Neurological:      General: No focal deficit present.      Mental Status: He is alert and oriented to person, place, and time.   Psychiatric:         Mood and Affect: Mood normal.         Behavior: Behavior normal.         Thought Content: Thought content normal.         Judgment: Judgment normal.         Diagnostic Data:            Lab Results (last 24 hours)       Procedure Component Value Units Date/Time    Ammonia [[id number]]  (Normal) Collected: 07/30/24 2005    Specimen: Blood Updated: 07/30/24 2030     Ammonia 26 umol/L     POC Glucose Once [[id number]]  (Abnormal) Collected: 07/30/24 2002    Specimen: Blood Updated: 07/30/24 2004     Glucose 69 mg/dL      Comment: Serial Number: [id number]:  [id number]       Ethanol [[id number]] Collected: 07/30/24 1602    Specimen: Blood Updated: 07/30/24 1717     Ethanol % 0.209 %     Narrative:      Plasma Ethanol Clinical Symptoms:    ETOH (%)               Clinical Symptom  .01-.05              No apparent influence  .03-.12              Euphoria, Diminished judgment and attention   .09-.25              Impaired comprehension, Muscle incoordination  .18-.30              Confusion, Staggered gait,  Slurred speech  .25-.40              Markedly decreased response to stimuli, unable to stand or                        walk, vomitting, sleep or stupor  .35-.50              Comatose, Anesthesia, Subnormal body temperature        Comprehensive Metabolic Panel [[id number]]  (Abnormal) Collected: 07/30/24 1602    Specimen: Blood Updated: 07/30/24 1645     Glucose 57 mg/dL      BUN 11 mg/dL      Creatinine 0.94 mg/dL      Sodium 137 mmol/L      Potassium 4.5 mmol/L      Comment: Slight hemolysis detected by analyzer. Result may be falsely elevated.        Chloride 91 mmol/L      CO2 12.6 mmol/L      Calcium 9.3 mg/dL      Total Protein 7.4 g/dL      Albumin 4.8 g/dL      ALT (SGPT) 382 U/L      AST (SGOT) 1,175 U/L      Comment: Slight hemolysis detected by analyzer. Result may be falsely elevated.        Alkaline Phosphatase 143 U/L      Total Bilirubin 2.6 mg/dL      Globulin 2.6 gm/dL      A/G Ratio 1.8 g/dL      BUN/Creatinine Ratio 11.7     Anion Gap 33.4 mmol/L      eGFR 110.5 mL/min/1.73     Narrative:      GFR Normal >60  Chronic Kidney Disease <60  Kidney Failure <15      Lipase [[id number]]  (Normal) Collected: 07/30/24 1602    Specimen: Blood Updated: 07/30/24 1644     Lipase 41 U/L     Magnesium [[id number]]  (Normal) Collected: 07/30/24 1602    Specimen: Blood Updated: 07/30/24 1644     Magnesium 1.7 mg/dL     Urinalysis With Microscopic If Indicated (No Culture) - Urine, Clean Catch [[id number]]  (Abnormal) Collected: 07/30/24 1621    Specimen: Urine, Clean Catch Updated: 07/30/24 1634     Color, UA Yellow     Appearance, UA Cloudy     pH, UA <=5.0     Specific Gravity, UA 1.017     Glucose, UA Negative     Ketones, UA 80 mg/dL (3+)     Bilirubin, UA Negative     Blood, UA Negative     Protein, UA 30 mg/dL (1+)     Leuk Esterase, UA Negative     Nitrite, UA Negative     Urobilinogen, UA 1.0 E.U./dL    Urinalysis, Microscopic Only - Urine, Clean Catch [[id number]] Collected: 07/30/24 1621    Specimen: Urine,  Clean Catch Updated: 07/30/24 1634     RBC, UA 0-2 /HPF      WBC, UA 0-2 /HPF      Bacteria, UA None Seen /HPF      Squamous Epithelial Cells, UA 0-2 /HPF      Hyaline Casts, UA 3-6 /LPF      Methodology Automated Microscopy    CBC & Differential [[id number]]  (Abnormal) Collected: 07/30/24 1602    Specimen: Blood Updated: 07/30/24 1617    Narrative:      The following orders were created for panel order CBC & Differential.  Procedure                               Abnormality         Status                     ---------                               -----------         ------                     CBC Auto Differential[[id number]]        Abnormal            Final result                 Please view results for these tests on the individual orders.    CBC Auto Differential [[id number]]  (Abnormal) Collected: 07/30/24 1602    Specimen: Blood Updated: 07/30/24 1617     WBC 10.25 10*3/mm3      RBC 4.99 10*6/mm3      Hemoglobin 17.2 g/dL      Hematocrit 50.3 %      .8 fL      MCH 34.5 pg      MCHC 34.2 g/dL      RDW 11.5 %      RDW-SD 42.4 fl      MPV 9.1 fL      Platelets 203 10*3/mm3      Neutrophil % 85.2 %      Lymphocyte % 6.2 %      Monocyte % 7.4 %      Eosinophil % 0.3 %      Basophil % 0.4 %      Immature Grans % 0.5 %      Neutrophils, Absolute 8.73 10*3/mm3      Lymphocytes, Absolute 0.64 10*3/mm3      Monocytes, Absolute 0.76 10*3/mm3      Eosinophils, Absolute 0.03 10*3/mm3      Basophils, Absolute 0.04 10*3/mm3      Immature Grans, Absolute 0.05 10*3/mm3      nRBC 0.0 /100 WBC              Imaging Results (Last 24 Hours)       Procedure Component Value Units Date/Time    CT Abdomen Pelvis With Contrast [[id number]] Collected: 07/30/24 1809     Updated: 07/30/24 1820    Narrative:      CT ABDOMEN PELVIS W CONTRAST    Date of Exam: 7/30/2024 5:19 PM EDT    Indication: abd pain/vomiting/elvated lfts.    Comparison: CT abdomen/pelvis 5/8/2023.    Technique: Axial CT images were obtained of the abdomen and  pelvis following the uneventful intravenous administration of iodinated contrast. Sagittal and coronal reconstructions were performed.  Automated exposure control and iterative reconstruction   methods were used.        Findings:  Lung Bases: No significant abnormality.    Liver: Hepatomegaly measuring 19 mm. Hepatic steatosis.     Gallbladder and biliary tree: No significant abnormality.     Spleen: No significant abnormality.     Pancreas: No significant abnormality.     Adrenal glands: No significant abnormality.     Kidneys and ureters: No significant abnormality.     Stomach and duodenum:No significant abnormality.    Small and large bowel: No evidence of obstruction. Normal appendix.    Peritoneal cavity: No free fluid or free air.    Bladder: No significant abnormality.     Pelvic organs: No significant abnormality.     Vasculature: No significant abnormality.     Lymph nodes: No pathologic appearing lymph nodes by imaging criteria.     Bones and soft tissues: No significant abnormality.      Impression:      Impression:  No acute findings in the abdomen/pelvis.    Hepatic steatosis with mild hepatomegaly.            Electronically Signed: Cholo Quintana    7/30/2024 6:18 PM EDT    Workstation ID: [id number]              Assessment & Plan        This is a 33 y.o. male with:    Active and Resolved Problems  Active Hospital Problems    Diagnosis  POA    **Abdominal pain [R10.9]  Yes     Priority: High    Alcohol withdrawal syndrome without complication [F10.930]  Yes     Priority: Medium    Hyperbilirubinemia [E80.6]  Yes     Priority: Medium    Tobacco use [Z72.0]  Yes      Resolved Hospital Problems   No resolved problems to display.       Abdominal pain, nausea vomiting CT abdomen negative for acute, 4 mg IV Zofran every 6 hours as needed nausea, gastroenterology consulted for hepatomegaly hepatic steatosis secondary to alcohol use, 40 mg p.o. Protonix daily,    Alcohol withdrawal syndrome, mildly tachycardic  stable on room air, ethanol 0.209, CIWA precautions in place for as needed Ativan, case management consulted, psychiatry consulted for possible rehab, banana bag in ED, on folate and thiamine, check magnesium in a.m.    Hyperbilirubinemia elevated LFTs increased since May 2023 likely secondary to alcohol use, avoid acetaminophen, gastroenterology consulted see plans above    Tobacco use, encourage cessation 21 mg nicotine patch ordered    VTE Prophylaxis:  Mechanical VTE prophylaxis orders are present.        The patient desires to be as follows:    CODE STATUS:    Code Status (Patient has no pulse and is not breathing): CPR (Attempt to Resuscitate)  Medical Interventions (Patient has pulse or is breathing): Full Support            Admission Status:  I believe this patient meets inpatient  status.    Expected Length of Stay: pending clinical course     PDMP and Medication Dispenses via Sidebar reviewed and consistent with patient reported medications.    I discussed the patient's findings and my recommendations with patient and family.      Signature:     This document has been electronically signed by DOMENICO Saini on July 30, 2024 23:12 EDT   [facility] Hospitalist Team    Electronically signed by Tiana Zamora MD at 07/31/24 0245          Emergency Department Notes        Jemma Kee, RN at 07/30/24 2045          Nursing report ED to floor  Jose Luis Elizabeth  33 y.o.  male    HPI:   Chief Complaint   Patient presents with    Nausea       Admitting doctor:   Tiana Zamora MD    Admitting diagnosis:   The primary encounter diagnosis was Upper abdominal pain. Diagnoses of Nausea and vomiting, unspecified vomiting type, Elevated LFTs, and Alcohol abuse were also pertinent to this visit.    Code status:   Current Code Status       Date Active Code Status Order ID Comments User Context       7/30/2024 2000 CPR (Attempt to Resuscitate) [id number]  Nara Del Rosario APRN ED         Question Answer    Code Status (Patient has no pulse and is not breathing) CPR (Attempt to Resuscitate)    Medical Interventions (Patient has pulse or is breathing) Full Support                    Allergies:   Patient has no known allergies.    Isolation:  No active isolations     Fall Risk:  Fall Risk Assessment was completed, and patient is at low risk for falls.   Predictive Model Details         3 (Low) Factor Value    Calculated 7/30/2024 20:45 Active Peripheral IV Present    Risk of Fall Model Imaging order in this encounter Present     Respiratory Rate 22     Age 33     Number of Distinct Medication Classes administered 7     Magnesium 1.7 mg/dL     Drug Use Not Asked     Total Bilirubin 2.6 mg/dL     Chloride 91 mmol/L      U/L     Tobacco Use Current     Jaxson Scale not on file     Diastolic BP 73     Clinically Relevant Sex Not Female     Number of administrations of Ulcer Drugs 1     Cardiac Assessment X     Calcium 9.3 mg/dL     Days after Admission 0.254     Potassium 4.5 mmol/L     Creatinine 0.94 mg/dL     Albumin 4.8 g/dL         Weight:   There were no vitals filed for this visit.    Intake and Output    Intake/Output Summary (Last 24 hours) at 7/30/2024 2045  Last data filed at 7/30/2024 1818  Gross per 24 hour   Intake 1050 ml   Output --   Net 1050 ml       Diet:   Dietary Orders (From admission, onward)       Start     Ordered    07/30/24 2002  Diet: Regular/House; Fluid Consistency: Thin (IDDSI 0)  Diet Effective Now        References:    Diet Order Crosswalk   Question Answer Comment   Diets: Regular/House    Fluid Consistency: Thin (IDDSI 0)        07/30/24 2002                     Most recent vitals:   Vitals:    07/30/24 1507 07/30/24 1539 07/30/24 1816 07/30/24 2008   BP: 126/83  141/95 116/73   BP Location: Left arm      Patient Position: Sitting      Pulse: (!) 124  119 99   Resp: 16  22 22   Temp:  97.2 °F (36.2 °C)     TempSrc:  Axillary     SpO2: 99%  97% 94%   Height: 177.8  "cm (70\")          Active LDAs/IV Access:   Lines, Drains & Airways       Active LDAs       Name Placement date Placement time Site Days    Peripheral IV 07/30/24 1603 Right Antecubital 07/30/24  1603  Antecubital  less than 1                    Skin Condition:   Skin Assessments (last day)       None             Labs (abnormal labs have a star):   Labs Reviewed   COMPREHENSIVE METABOLIC PANEL - Abnormal; Notable for the following components:       Result Value    Glucose 57 (*)     Chloride 91 (*)     CO2 12.6 (*)     ALT (SGPT) 382 (*)     AST (SGOT) 1,175 (*)     Alkaline Phosphatase 143 (*)     Total Bilirubin 2.6 (*)     Anion Gap 33.4 (*)     All other components within normal limits    Narrative:     GFR Normal >60  Chronic Kidney Disease <60  Kidney Failure <15     URINALYSIS W/ MICROSCOPIC IF INDICATED (NO CULTURE) - Abnormal; Notable for the following components:    Appearance, UA Cloudy (*)     Ketones, UA 80 mg/dL (3+) (*)     Protein, UA 30 mg/dL (1+) (*)     All other components within normal limits   CBC WITH AUTO DIFFERENTIAL - Abnormal; Notable for the following components:    .8 (*)     MCH 34.5 (*)     RDW 11.5 (*)     Neutrophil % 85.2 (*)     Lymphocyte % 6.2 (*)     Neutrophils, Absolute 8.73 (*)     Lymphocytes, Absolute 0.64 (*)     All other components within normal limits   POCT GLUCOSE FINGERSTICK - Abnormal; Notable for the following components:    Glucose 69 (*)     All other components within normal limits   LIPASE - Normal   MAGNESIUM - Normal   AMMONIA - Normal   URINALYSIS, MICROSCOPIC ONLY   ETHANOL    Narrative:     Plasma Ethanol Clinical Symptoms:    ETOH (%)               Clinical Symptom  .01-.05              No apparent influence  .03-.12              Euphoria, Diminished judgment and attention   .09-.25              Impaired comprehension, Muscle incoordination  .18-.30              Confusion, Staggered gait, Slurred speech  .25-.40              Markedly decreased " response to stimuli, unable to stand or                        walk, vomitting, sleep or stupor  .35-.50              Comatose, Anesthesia, Subnormal body temperature       CBC AND DIFFERENTIAL    Narrative:     The following orders were created for panel order CBC & Differential.  Procedure                               Abnormality         Status                     ---------                               -----------         ------                     CBC Auto Differential[[id number]]        Abnormal            Final result                 Please view results for these tests on the individual orders.       LOC: Person, Place, Time, and Situation    Telemetry:  Med/Surg    Cardiac Monitoring Ordered: yes    EKG:   ECG 12 Lead Drug Monitoring; ETOH   Preliminary Result   HEART FSDO=572  bpm   RR Ckqpqkqt=466  ms   VA Gvymngje=883  ms   P Horizontal Axis=40  deg   P Front Axis=60  deg   QRSD Interval=89  ms   QT Uqksoxuh=102  ms   LIqS=432  ms   QRS Axis=37  deg   T Wave Axis=23  deg   - OTHERWISE NORMAL ECG -   Sinus tachycardia   No previous ECG available for comparison   Date and Time of Study:2024-07-30 15:12:14          Medications Given in the ED:   Medications   sodium chloride 0.9 % flush 10 mL (has no administration in time range)   iopamidol (ISOVUE-370) 76 % injection 100 mL (has no administration in time range)   Magnesium Standard Dose Replacement - Follow Nurse / BPA Driven Protocol (has no administration in time range)   sodium chloride 0.9 % infusion (has no administration in time range)   ondansetron ODT (ZOFRAN-ODT) disintegrating tablet 4 mg (has no administration in time range)     Or   ondansetron (ZOFRAN) injection 4 mg (has no administration in time range)   thiamine (B-1) injection 200 mg (has no administration in time range)     Followed by   thiamine (VITAMIN B-1) tablet 100 mg (has no administration in time range)   folic acid (FOLVITE) tablet 1 mg (has no administration in time range)    multivitamin with minerals 1 tablet (has no administration in time range)   LORazepam (ATIVAN) tablet 1 mg (has no administration in time range)     Or   LORazepam (ATIVAN) injection 1 mg (has no administration in time range)     Or   LORazepam (ATIVAN) tablet 2 mg (has no administration in time range)     Or   LORazepam (ATIVAN) injection 2 mg (has no administration in time range)     Or   LORazepam (ATIVAN) injection 2 mg (has no administration in time range)     Or   LORazepam (ATIVAN) injection 2 mg (has no administration in time range)   labetalol (NORMODYNE,TRANDATE) injection 10 mg (has no administration in time range)   ketorolac (TORADOL) injection 15 mg (has no administration in time range)   sodium chloride 0.9 % infusion 1,000 mL (0 mL Intravenous Stopped 7/30/24 1818)   folic acid 1 mg in sodium chloride 0.9 % 50 mL IVPB (0 mg Intravenous Stopped 7/30/24 1818)   thiamine (B-1) injection 200 mg (200 mg Intravenous Given 7/30/24 1617)   famotidine (PEPCID) injection 20 mg (20 mg Intravenous Given 7/30/24 1617)   ondansetron (ZOFRAN) injection 4 mg (4 mg Intravenous Given 7/30/24 1617)   LORazepam (ATIVAN) injection 1 mg (1 mg Intravenous Given 7/30/24 1740)   iopamidol (ISOVUE-370) 76 % injection 100 mL (100 mL Intravenous Given 7/30/24 1720)       Imaging results:  CT Abdomen Pelvis With Contrast    Result Date: 7/30/2024  Impression: No acute findings in the abdomen/pelvis. Hepatic steatosis with mild hepatomegaly. Electronically Signed: Cholo Quintana  7/30/2024 6:18 PM EDT  Workstation ID: [id number]     Social issues:   Social History     Socioeconomic History    Marital status:    Tobacco Use    Smoking status: Every Day     Current packs/day: 1.50     Average packs/day: 1.5 packs/day for 15.0 years (22.5 ttl pk-yrs)     Types: Cigarettes    Smokeless tobacco: Never   Vaping Use    Vaping status: Never Used   Substance and Sexual Activity    Alcohol use: Not Currently     Comment: SOCIALLY         NIH Stroke Scale:  Interval: (not recorded)  1a. Level of Consciousness: (not recorded)  1b. LOC Questions: (not recorded)  1c. LOC Commands: (not recorded)  2. Best Gaze: (not recorded)  3. Visual: (not recorded)  4. Facial Palsy: (not recorded)  5a. Motor Arm, Left: (not recorded)  5b. Motor Arm, Right: (not recorded)  6a. Motor Leg, Left: (not recorded)  6b. Motor Leg, Right: (not recorded)  7. Limb Ataxia: (not recorded)  8. Sensory: (not recorded)  9. Best Language: (not recorded)  10. Dysarthria: (not recorded)  11. Extinction and Inattention (formerly Neglect): (not recorded)    Total (NIH Stroke Scale): (not recorded)     Additional notable assessment information:     Nursing report ED to floor:  Bertha Kee RN   07/30/24 20:45 EDT       Electronically signed by Jemma Kee RN at 07/30/24 2045       Mahsa Mac APRN at 07/30/24 1943       Attestation signed by Fahad Wylie MD at 07/30/24 3368        SHARED APC NON FACE TO FACE: I performed a substantive part of the MDM during the patient's E/M visit. I personally made or approved the documented management plan and acknowledge its risk of complications.   Fahad Wylie MD 7/30/2024 22:17 EDT                         Subjective   History of Present Illness  Patient is a 33-year-old white male who presents with complaints of upper abdominal pain nausea vomiting.  He states he is a daily drinker, drinks about 1/5 daily.  His last drink was this morning.  States he is never had withdrawal seizures before but he does occasionally get shaky and anxious if he goes longer than 2 or 3 hours without alcohol.  States he feels more drowsy than usual today.  He had some intermittent episodes of abdominal pain and vomiting for the last several months.  He was seen here in the ED a about a year ago was told he had elevated liver enzymes and hepatitis but never followed up with gastroenterology.  States his pain is worse today and  different than usual and is associated with some vomiting.  No bloody emesis.  Denies any chest pain or shortness of breath.  He reports prior history of IV drug use but has been clean for several years.  He is inquiring about alcohol detox today as well.      Review of Systems   Constitutional:  Negative for fever.   Respiratory:  Negative for shortness of breath.    Cardiovascular:  Negative for chest pain.   Gastrointestinal:  Positive for abdominal pain, nausea and vomiting. Negative for blood in stool and diarrhea.   Genitourinary:  Negative for dysuria.       No past medical history on file.    No Known Allergies    No past surgical history on file.    No family history on file.    Social History     Socioeconomic History    Marital status:    Tobacco Use    Smoking status: Every Day     Current packs/day: 1.50     Average packs/day: 1.5 packs/day for 15.0 years (22.5 ttl pk-yrs)     Types: Cigarettes    Smokeless tobacco: Never   Vaping Use    Vaping status: Never Used   Substance and Sexual Activity    Alcohol use: Not Currently     Comment: SOCIALLY            Objective   Physical Exam  Vital signs and triage nurse note reviewed.  Constitutional: Awake, alert; well-developed and well-nourished. No acute distress is noted.  Smells of alcohol.  HEENT: Normocephalic, atraumatic; pupils are PERRL with intact EOM; oropharynx is pink and moist without exudate or erythema.  No drooling or pooling of oral secretions.  Neck: Supple, full range of motion without pain; no cervical lymphadenopathy. Normal phonation.  Cardiovascular: Tachycardic rate and rhythm, normal S1-S2.  No murmur noted.  Pulmonary: Respiratory effort regular nonlabored, breath sounds clear to auscultation all fields.  Abdomen: Soft, tender across the upper abdomen, nondistended with normoactive bowel sounds; no rebound or guarding.  Musculoskeletal: Independent range of motion of all extremities with no palpable tenderness or  edema.  Neuro: Alert oriented x3, speech is clear and appropriate, GCS 15.    Skin: Flesh tone, warm, dry, intact; no erythematous or petechial rash or lesion.    Procedures          ED Course  ED Course as of 07/30/24 1957 Tue Jul 30, 2024   1619 Due to significant overcrowding in the emergency department patient was evaluated by myself in a hallway bed. This exam may be limited by privacy, noise level and the patient not wearing a hospital gown.  Explained to the patient our limitations and our overcrowding.  They were in agreement to continue the exam and treatment at this time.    [MD]      ED Course User Index  [MD] Mahsa Mac APRN      Labs Reviewed   COMPREHENSIVE METABOLIC PANEL - Abnormal; Notable for the following components:       Result Value    Glucose 57 (*)     Chloride 91 (*)     CO2 12.6 (*)     ALT (SGPT) 382 (*)     AST (SGOT) 1,175 (*)     Alkaline Phosphatase 143 (*)     Total Bilirubin 2.6 (*)     Anion Gap 33.4 (*)     All other components within normal limits    Narrative:     GFR Normal >60  Chronic Kidney Disease <60  Kidney Failure <15     URINALYSIS W/ MICROSCOPIC IF INDICATED (NO CULTURE) - Abnormal; Notable for the following components:    Appearance, UA Cloudy (*)     Ketones, UA 80 mg/dL (3+) (*)     Protein, UA 30 mg/dL (1+) (*)     All other components within normal limits   CBC WITH AUTO DIFFERENTIAL - Abnormal; Notable for the following components:    .8 (*)     MCH 34.5 (*)     RDW 11.5 (*)     Neutrophil % 85.2 (*)     Lymphocyte % 6.2 (*)     Neutrophils, Absolute 8.73 (*)     Lymphocytes, Absolute 0.64 (*)     All other components within normal limits   LIPASE - Normal   MAGNESIUM - Normal   URINALYSIS, MICROSCOPIC ONLY   ETHANOL    Narrative:     Plasma Ethanol Clinical Symptoms:    ETOH (%)               Clinical Symptom  .01-.05              No apparent influence  .03-.12              Euphoria, Diminished judgment and attention   .09-.25              Impaired  comprehension, Muscle incoordination  .18-.30              Confusion, Staggered gait, Slurred speech  .25-.40              Markedly decreased response to stimuli, unable to stand or                        walk, vomitting, sleep or stupor  .35-.50              Comatose, Anesthesia, Subnormal body temperature       AMMONIA   CBC AND DIFFERENTIAL    Narrative:     The following orders were created for panel order CBC & Differential.  Procedure                               Abnormality         Status                     ---------                               -----------         ------                     CBC Auto Differential[[id number]]        Abnormal            Final result                 Please view results for these tests on the individual orders.     CT Abdomen Pelvis With Contrast    Result Date: 7/30/2024  Impression: No acute findings in the abdomen/pelvis. Hepatic steatosis with mild hepatomegaly. Electronically Signed: Cholo Quintana  7/30/2024 6:18 PM EDT  Workstation ID: [id number]   Medications   sodium chloride 0.9 % flush 10 mL (has no administration in time range)   iopamidol (ISOVUE-370) 76 % injection 100 mL (has no administration in time range)   sodium chloride 0.9 % infusion 1,000 mL (0 mL Intravenous Stopped 7/30/24 1818)   folic acid 1 mg in sodium chloride 0.9 % 50 mL IVPB (0 mg Intravenous Stopped 7/30/24 1818)   thiamine (B-1) injection 200 mg (200 mg Intravenous Given 7/30/24 1617)   famotidine (PEPCID) injection 20 mg (20 mg Intravenous Given 7/30/24 1617)   ondansetron (ZOFRAN) injection 4 mg (4 mg Intravenous Given 7/30/24 1617)   LORazepam (ATIVAN) injection 1 mg (1 mg Intravenous Given 7/30/24 1740)   iopamidol (ISOVUE-370) 76 % injection 100 mL (100 mL Intravenous Given 7/30/24 1720)                                            Medical Decision Making  Patient presents today with the above complaint.    He had the above exam and evaluation.  He was placed on continuous cardiac monitor.   IV was established.  Labs EKG CT were obtained.  He was given a liter banana bag as well as IV Zofran.  He is mildly tremulous on exam was given 1 mg of IV Ativan.    Workup: EKG independently interpreted by Dr. Sharma myself shows sinus tachycardia with ventricular rate of 113, no acute ST or T wave changes.  CBC is unremarkable.  Metabolic panel significant for glucose of 57, CO2 12.6, , AST 1175, alk phos 143, total bili 2.6 (previously 253, 147, 112, 0.9 respectively on 5/8/2023.  Normal lipase of 41.  Magnesium within normal limits at 1.7.  Blood ethanol level 0.209%.  Urinalysis shows no evidence of infection, positive for 80 ketones.  CT of the abdomen pelvis shows no acute findings.  Hepatic steatosis with mild hepatomegaly.    Patient was given Sprite to drink for his blood glucose reading.  On reexamination patient resting quietly no distress.  He has no new complaints.  Reports some continued nausea and abdominal discomfort.  Remains mildly tachycardic.  He is afebrile.  Blood pressures are stable.    Findings were discussed with him.  He will be admitted to the hospital for further management.  Case discussed with the hospitalist respecters for.  Ammonia pending.    Problems Addressed:  Alcohol abuse: complicated acute illness or injury  Elevated LFTs: complicated acute illness or injury  Nausea and vomiting, unspecified vomiting type: complicated acute illness or injury  Upper abdominal pain: complicated acute illness or injury    Amount and/or Complexity of Data Reviewed  Labs: ordered.  Radiology: ordered.    Risk  Prescription drug management.  Decision regarding hospitalization.        Final diagnoses:   Upper abdominal pain   Nausea and vomiting, unspecified vomiting type   Elevated LFTs   Alcohol abuse       ED Disposition  ED Disposition       ED Disposition   Decision to Admit    Condition   --    Comment   --               No follow-up provider specified.       Medication List      No  changes were made to your prescriptions during this visit.            Mahsa Mac, DOMENICO  07/30/24 1957      Electronically signed by Fahad Wylie MD at 07/30/24 2217       Jemma Kee, RN at 07/30/24 1705          RN called down to have lab add green tube ETOH added to previous specimen sent down earlier. Lab agreed and stated would run.     Electronically signed by Jemma Kee, FERNANDO at 07/30/24 1706       Ayse Bell at 07/30/24 1512          EKG requested       Electronically signed by Ayse Bell at 07/30/24 1513       Vital Signs (last 2 days)       Date/Time Temp Temp src Pulse Resp BP Patient Position SpO2    08/01/24 1149 98 (36.7) Oral 77 16 117/73 Lying 98    08/01/24 0900 97.6 (36.4) Tympanic -- 17 -- Lying --    08/01/24 0317 97.5 (36.4) Oral 79 18 138/93 Lying 99    07/31/24 2350 97.4 (36.3) Oral 87 16 132/84 Lying 99    07/31/24 2004 97.9 (36.6) Oral 100 19 135/91 Lying 95    07/31/24 1440 98.1 (36.7) Tympanic 85 17 135/93 Lying 97    07/31/24 1100 98.1 (36.7) Oral -- 20 -- Lying --    07/31/24 0816 98 (36.7) Oral -- 13 -- Lying --    07/31/24 0500 99.4 (37.4) Oral 92 20 129/84 Lying 98    07/31/24 0300 -- -- 102 -- 123/84 -- --    07/31/24 0130 98.8 (37.1) Oral 107 18 123/85 Lying 94    07/31/24 0100 -- -- 97 -- 124/84 -- 94    07/30/24 2340 98.1 (36.7) Oral 103 16 119/78 Lying 98    07/30/24 2300 -- -- 96 -- 110/66 -- 94    07/30/24 2100 98.1 (36.7) Oral 103 16 121/83 Lying 97    07/30/24 20:08:19 -- -- 99 22 116/73 -- 94    07/30/24 18:16:45 -- -- 119 22 141/95 -- 97    07/30/24 15:39:21 97.2 (36.2) Axillary -- -- -- -- --    07/30/24 1507 -- -- 124 16 126/83 Sitting 99          Oxygen Therapy (last 2 days)       Date/Time SpO2 Device (Oxygen Therapy) Flow (L/min) Oxygen Concentration (%) ETCO2 (mmHg)    08/01/24 1149 98 room air -- -- --    08/01/24 0900 -- room air -- -- --    08/01/24 0830 -- room air -- -- --    08/01/24 0317 99 room air -- -- --    07/31/24 4550 99  room air -- -- --    07/31/24 2004 95 room air -- -- --    07/31/24 2000 -- room air -- -- --    07/31/24 1440 97 room air -- -- --    07/31/24 1430 -- room air -- -- --    07/31/24 1200 -- room air -- -- --    07/31/24 1100 -- room air -- -- --    07/31/24 0816 -- room air -- -- --    07/31/24 0800 -- room air -- -- --    07/31/24 0500 98 room air -- -- --    07/31/24 0130 94 room air -- -- --    07/31/24 0100 94 -- -- -- --    07/30/24 2340 98 room air -- -- --    07/30/24 2300 94 -- -- -- --    07/30/24 2100 97 room air -- -- --    07/30/24 20:08:19 94 room air -- -- --    07/30/24 18:16:45 97 -- -- -- --    07/30/24 1507 99 room air -- -- --          CIWA (since admission)        Date/Time CIWA-Ar Score    08/01/24 0830 8     08/01/24 0600 1     08/01/24 0515 1     08/01/24 0458 1     08/01/24 0400 22     08/01/24 0300 3     08/01/24 0200 3     08/01/24 0100 10     07/31/24 2300 3     07/31/24 2200 3     07/31/24 2100 3     07/31/24 2022 5     07/31/24 2007 5     07/31/24 1944 18     07/31/24 1430 11     07/31/24 1300 10     07/31/24 0800 6     07/31/24 0600 4     07/31/24 0100 3     07/30/24 2100 5     07/30/24 19:47:28 5     07/30/24 1645 8     07/30/24 15:16:47 4                   Facility-Administered Medications as of 8/1/2024   Medication Dose Route Frequency Provider Last Rate Last Admin    [COMPLETED] famotidine (PEPCID) injection 20 mg  20 mg Intravenous Once Mahsa Mac APRN   20 mg at 07/30/24 1617    folic acid (FOLVITE) tablet 1 mg  1 mg Oral Daily Nara Del Rosario APRN   1 mg at 08/01/24 0903    [COMPLETED] folic acid 1 mg in sodium chloride 0.9 % 50 mL IVPB  1 mg Intravenous Once Mahsa Mac APRN   Stopped at 07/30/24 1818    [COMPLETED] iopamidol (ISOVUE-370) 76 % injection 100 mL  100 mL Intravenous Once in imaging Mahsa Mac APRN   100 mL at 07/30/24 1720    iopamidol (ISOVUE-370) 76 % injection 100 mL  100 mL Intravenous Once in imaging Mahsa Mac APRN         [COMPLETED] ketorolac (TORADOL) injection 15 mg  15 mg Intravenous Once Mahsa Mac APRN   15 mg at 07/30/24 2103    labetalol (NORMODYNE,TRANDATE) injection 10 mg  10 mg Intravenous Q6H PRN Nara Del Rosario APRN        [COMPLETED] LORazepam (ATIVAN) injection 1 mg  1 mg Intravenous Once Mahsa Mac APRN   1 mg at 07/30/24 1740    LORazepam (ATIVAN) tablet 1 mg  1 mg Oral Q1H PRN Andre-Nara Chamberlain APRN   1 mg at 07/31/24 1316    Or    LORazepam (ATIVAN) injection 1 mg  1 mg Intravenous Q1H PRN Nara Del Rosario APRN   1 mg at 08/01/24 0115    Or    LORazepam (ATIVAN) tablet 2 mg  2 mg Oral Q1H PRN Nara Del Rosario APRN        Or    LORazepam (ATIVAN) injection 2 mg  2 mg Intravenous Q1H PRN Nara Del Rosario APRN   2 mg at 08/01/24 0903    Or    LORazepam (ATIVAN) injection 2 mg  2 mg Intravenous Q15 Min PRN Nara Del Rosario APRN   2 mg at 08/01/24 0442    Or    LORazepam (ATIVAN) injection 2 mg  2 mg Intramuscular Q15 Min PRN Nara Del Rosario APRN        Magnesium Standard Dose Replacement - Follow Nurse / BPA Driven Protocol   Does not apply PRN Nara Del Rosario APRN        multivitamin with minerals 1 tablet  1 tablet Oral Daily Nara Del Rosario APRN   1 tablet at 08/01/24 0903    nicotine (NICODERM CQ) 21 MG/24HR patch 1 patch  1 patch Transdermal Q24H Nara Del Rosario APRN   1 patch at 08/01/24 0904    [COMPLETED] ondansetron (ZOFRAN) injection 4 mg  4 mg Intravenous Once Mahsa Mac APRN   4 mg at 07/30/24 1617    ondansetron ODT (ZOFRAN-ODT) disintegrating tablet 4 mg  4 mg Oral Q6H PRN Nara Del Rosario APRN        Or    ondansetron (ZOFRAN) injection 4 mg  4 mg Intravenous Q6H PRN Nara Del Rosario APRN        pantoprazole (PROTONIX) EC tablet 40 mg  40 mg Oral Q AM Nraa Del Rosario APRN   40 mg at 08/01/24 0529    sodium chloride 0.9 % flush 10 mL  10 mL Intravenous PRN Mahsa Mac APRN   10 mL at 08/01/24 0903     "[COMPLETED] sodium chloride 0.9 % infusion 1,000 mL  1,000 mL Intravenous Once Mahsa Mac APRN   Stopped at 07/30/24 1818    sodium chloride 0.9 % infusion  100 mL/hr Intravenous Continuous Naar Del Rosario APRN 100 mL/hr at 08/01/24 0322 100 mL/hr at 08/01/24 0322    [COMPLETED] thiamine (B-1) injection 200 mg  200 mg Intravenous Once Mahsa Mac APRN   200 mg at 07/30/24 1617    thiamine (B-1) injection 200 mg  200 mg Intravenous Q8H Nara Del Rosario APRN   200 mg at 08/01/24 0530    Followed by    [START ON 8/5/2024] thiamine (VITAMIN B-1) tablet 100 mg  100 mg Oral Daily Nara Del Rosario APRN        traMADol (ULTRAM) tablet 50 mg  50 mg Oral Q6H PRN Jj Harris MD   50 mg at 07/31/24 1501     Operative/Procedure Notes (all)    No notes of this type exist for this encounter.          Physician Progress Notes (all)        Kat Schaffer APRN at 08/01/24 0956           LOS: 0 days   Patient Care Team:  Provider, No Known as PCP - General      Subjective \"Doing better this morning\"    Interval History:     Subjective: He reports night terrors last night.  No nausea or vomiting, tolerating diet.  No nausea or vomiting.  No abdominal pain.    ROS:   No chest pain, shortness of breath, or cough.      Medication Review:     Current Facility-Administered Medications:     folic acid (FOLVITE) tablet 1 mg, 1 mg, Oral, Daily, Nara Del Rosario APRN, 1 mg at 08/01/24 0903    iopamidol (ISOVUE-370) 76 % injection 100 mL, 100 mL, Intravenous, Once in imaging, Mahsa Mac APRN    labetalol (NORMODYNE,TRANDATE) injection 10 mg, 10 mg, Intravenous, Q6H PRN, Nara Del Rosario APRN    LORazepam (ATIVAN) tablet 1 mg, 1 mg, Oral, Q1H PRN, 1 mg at 07/31/24 1316 **OR** LORazepam (ATIVAN) injection 1 mg, 1 mg, Intravenous, Q1H PRN, 1 mg at 08/01/24 0115 **OR** LORazepam (ATIVAN) tablet 2 mg, 2 mg, Oral, Q1H PRN **OR** LORazepam (ATIVAN) injection 2 mg, 2 mg, Intravenous, Q1H PRN, 2 mg " at 08/01/24 0903 **OR** LORazepam (ATIVAN) injection 2 mg, 2 mg, Intravenous, Q15 Min PRN, 2 mg at 08/01/24 0442 **OR** LORazepam (ATIVAN) injection 2 mg, 2 mg, Intramuscular, Q15 Min PRN, Nara Del Rosario APRN    Magnesium Standard Dose Replacement - Follow Nurse / BPA Driven Protocol, , Does not apply, PRN, Nara Del Rosario APRN    multivitamin with minerals 1 tablet, 1 tablet, Oral, Daily, Nara Del Rosario APRN, 1 tablet at 08/01/24 0903    nicotine (NICODERM CQ) 21 MG/24HR patch 1 patch, 1 patch, Transdermal, Q24H, Nara Del Rosario APRN, 1 patch at 08/01/24 0904    ondansetron ODT (ZOFRAN-ODT) disintegrating tablet 4 mg, 4 mg, Oral, Q6H PRN **OR** ondansetron (ZOFRAN) injection 4 mg, 4 mg, Intravenous, Q6H PRN, Nara Del Rosario APRN    pantoprazole (PROTONIX) EC tablet 40 mg, 40 mg, Oral, Q AM, Nara Del Rosario APRN, 40 mg at 08/01/24 0529    [COMPLETED] Insert Peripheral IV, , , Once **AND** sodium chloride 0.9 % flush 10 mL, 10 mL, Intravenous, PRN, Mahsa Mac APRN, 10 mL at 08/01/24 0903    sodium chloride 0.9 % infusion, 100 mL/hr, Intravenous, Continuous, Nara Del Rosario APRN, Last Rate: 100 mL/hr at 08/01/24 0322, 100 mL/hr at 08/01/24 0322    thiamine (B-1) injection 200 mg, 200 mg, Intravenous, Q8H, 200 mg at 08/01/24 0530 **FOLLOWED BY** [START ON 8/5/2024] thiamine (VITAMIN B-1) tablet 100 mg, 100 mg, Oral, Daily, Nara Del Rosario APRN    traMADol (ULTRAM) tablet 50 mg, 50 mg, Oral, Q6H PRN, Jj Harris MD, 50 mg at 07/31/24 1501      Objective ambulating in room, NAD    Vital Signs  Vitals:    07/31/24 2004 07/31/24 2350 08/01/24 0317 08/01/24 0320   BP: 135/91 132/84 138/93    BP Location: Left arm Left arm Left arm    Patient Position: Lying Lying Lying    Pulse: 100 87 79    Resp: 19 16 18    Temp: 97.9 °F (36.6 °C) 97.4 °F (36.3 °C) 97.5 °F (36.4 °C)    TempSrc: Oral Oral Oral    SpO2: 95% 99% 99%    Weight:    82.7 kg (182 lb 5.1 oz)  "  Height:           Physical Exam:     General Appearance:    Awake and alert, in no acute distress   Head:    Normocephalic, without obvious abnormality   Eyes:          Conjunctivae normal, anicteric sclera   Throat:   No oral lesions, no thrush, oral mucosa moist   Neck:   supple, no JVD   Lungs:     respirations regular, even and unlabored   Abdomen:     Soft, nondistended   Rectal:     Deferred   Extremities:   No edema, no cyanosis   Skin:   No bruising or rash, no jaundice        Results Review:    CBC    Results from last 7 days   Lab Units 08/01/24 0213 07/30/24 2359 07/30/24  1602   WBC 10*3/mm3 4.79 6.59 10.25   HEMOGLOBIN g/dL 13.9 14.7 17.2   PLATELETS 10*3/mm3 137* 155 203     CMP   Results from last 7 days   Lab Units 08/01/24 0213 07/30/24 2359 07/30/24 2005 07/30/24  1602   SODIUM mmol/L 136 134*  --  137   POTASSIUM mmol/L 4.0 4.2  --  4.5   CHLORIDE mmol/L 98 97*  --  91*   CO2 mmol/L 26.9 20.2*  --  12.6*   BUN mg/dL 11 9  --  11   CREATININE mg/dL 0.78 0.76  --  0.94   GLUCOSE mg/dL 76 113*  --  57*   ALBUMIN g/dL 3.9 4.0  --  4.8   BILIRUBIN mg/dL 1.4* 1.0  --  2.6*   ALK PHOS U/L 95 110  --  143*   AST (SGOT) U/L 200* 594*  --  1,175*   ALT (SGPT) U/L 212* 315*  --  382*   MAGNESIUM mg/dL 1.8 1.6  --  1.7   PHOSPHORUS mg/dL 2.6  --   --   --    LIPASE U/L  --   --   --  41   AMMONIA umol/L  --   --  26  --      Cr Clearance Estimated Creatinine Clearance: 159 mL/min (by C-G formula based on SCr of 0.78 mg/dL).  Coag   Results from last 7 days   Lab Units 07/31/24  0955   INR  1.07     HbA1C No results found for: \"HGBA1C\"      Infection     UA    Results from last 7 days   Lab Units 07/30/24  1621   NITRITE UA  Negative   WBC UA /HPF 0-2   BACTERIA UA /HPF None Seen   SQUAM EPITHEL UA /HPF 0-2     Microbiology Results (last 10 days)       ** No results found for the last 240 hours. **          Imaging Results (Last 72 Hours)       Procedure Component Value Units Date/Time    CT Abdomen " Pelvis With Contrast [[id number]] Collected: 07/30/24 1809     Updated: 07/30/24 1820    Narrative:      CT ABDOMEN PELVIS W CONTRAST    Date of Exam: 7/30/2024 5:19 PM EDT    Indication: abd pain/vomiting/elvated lfts.    Comparison: CT abdomen/pelvis 5/8/2023.    Technique: Axial CT images were obtained of the abdomen and pelvis following the uneventful intravenous administration of iodinated contrast. Sagittal and coronal reconstructions were performed.  Automated exposure control and iterative reconstruction   methods were used.        Findings:  Lung Bases: No significant abnormality.    Liver: Hepatomegaly measuring 19 mm. Hepatic steatosis.     Gallbladder and biliary tree: No significant abnormality.     Spleen: No significant abnormality.     Pancreas: No significant abnormality.     Adrenal glands: No significant abnormality.     Kidneys and ureters: No significant abnormality.     Stomach and duodenum:No significant abnormality.    Small and large bowel: No evidence of obstruction. Normal appendix.    Peritoneal cavity: No free fluid or free air.    Bladder: No significant abnormality.     Pelvic organs: No significant abnormality.     Vasculature: No significant abnormality.     Lymph nodes: No pathologic appearing lymph nodes by imaging criteria.     Bones and soft tissues: No significant abnormality.      Impression:      Impression:  No acute findings in the abdomen/pelvis.    Hepatic steatosis with mild hepatomegaly.            Electronically Signed: Cholo Quintana    7/30/2024 6:18 PM EDT    Workstation ID: [id number]            Assessment & Plan   Elevated LFTs -likely alcohol hepatitis - improved  Alcohol abuse  History of drug abuse     PLAN:  Patient is a 33-year-old male with a history of drug abuse and daily alcohol abuse who presents with increased weakness and nausea with vomiting.  He is overall feeling better today.  He does report a history of withdrawal symptoms.     Total bilirubin 1.4,  alk phos 95, , , WBC 4.7, hemoglobin 13.9, platelets 137.  Iron 178, AFP 6.6, HCVRNA positive, full serology workup pending    EtOH level 0.209 upon admission. Feeling better today.  Psychiatry following for withdrawal.  Alcohol cessation strongly recommended.  Okay for diet as tolerated.  Full liver workup in progress including 24-hour urine copper and hemochromatosis gene.  HCV RNA pending.  Little more for GI to offer at this time, call with questions or concerns.  We will see inpatient as needed.  He will follow-up with us as an outpatient for failure of their management of his liver disease.      Electronically signed by DOMENICO Gilman, 24, 9:56 AM EDT.            Electronically signed by Kat Schaffer APRN at 24 0959       Jj Harris MD at 24 [facility]              [facility] MEDICINE SERVICE  DAILY PROGRESS NOTE    NAME: Jose Luis Elizabeth  : 1990  MRN: [id number]      LOS: 1 day     PROVIDER OF SERVICE: Jj Harris MD    Chief Complaint: Abdominal pain    Subjective:     Interval History:  History taken from: patient chart  Mentation ok abd pain and nausea controlled     Review of Systems:   Review of Systems  All negative except as above   Objective:     Vital Signs  Temp:  [97.2 °F (36.2 °C)-99.4 °F (37.4 °C)] 98.1 °F (36.7 °C)  Heart Rate:  [] 92  Resp:  [13-22] 20  BP: (110-141)/(66-95) 129/84   Body mass index is 24.45 kg/m².    Physical Exam  Physical Exam  AOx3 anxious  RRR S1 and S2 audible  Lungs with fair air entry  Abdomen soft nontender nondistended  Scheduled Meds   folic acid, 1 mg, Oral, Daily  iopamidol, 100 mL, Intravenous, Once in imaging  multivitamin with minerals, 1 tablet, Oral, Daily  nicotine, 1 patch, Transdermal, Q24H  pantoprazole, 40 mg, Oral, Q AM  thiamine (B-1) IV, 200 mg, Intravenous, Q8H   Followed by  [START ON 2024] thiamine, 100 mg, Oral, Daily       PRN Meds     labetalol    LORazepam **OR**  LORazepam **OR** LORazepam **OR** LORazepam **OR** LORazepam **OR** LORazepam    Magnesium Standard Dose Replacement - Follow Nurse / BPA Driven Protocol    ondansetron ODT **OR** ondansetron    [COMPLETED] Insert Peripheral IV **AND** sodium chloride    traMADol   Infusions  sodium chloride, 100 mL/hr, Last Rate: 100 mL/hr (07/30/24 2128)          Diagnostic Data    Results from last 7 days   Lab Units 07/30/24  2359   WBC 10*3/mm3 6.59   HEMOGLOBIN g/dL 14.7   HEMATOCRIT % 42.3   PLATELETS 10*3/mm3 155   GLUCOSE mg/dL 113*   CREATININE mg/dL 0.76   BUN mg/dL 9   SODIUM mmol/L 134*   POTASSIUM mmol/L 4.2   AST (SGOT) U/L 594*   ALT (SGPT) U/L 315*   ALK PHOS U/L 110   BILIRUBIN mg/dL 1.0   ANION GAP mmol/L 16.8*       CT Abdomen Pelvis With Contrast    Result Date: 7/30/2024  Impression: No acute findings in the abdomen/pelvis. Hepatic steatosis with mild hepatomegaly. Electronically Signed: Cholo Quintana  7/30/2024 6:18 PM EDT  Workstation ID: [id number]       I reviewed the patient's new clinical results.  I reviewed the patient's new imaging results and agree with the interpretation.    Assessment/Plan:     Active and Resolved Problems  Active Hospital Problems    Diagnosis  POA    **Abdominal pain [R10.9]  Yes    Alcohol withdrawal syndrome without complication [F10.930]  Yes    Hyperbilirubinemia [E80.6]  Yes    Tobacco use [Z72.0]  Yes      Resolved Hospital Problems   No resolved problems to display.       33-year-old male with history of EtOH abuse, tobacco abuse former IVDU admitted to [facility] 7/30 with abdominal pain associated with nausea and vomiting    #EtOH intoxication complicated by withdrawal  Daily drinker  Continue CIWA monitoring per protocol  IV thiamine along with folic acid and MVI  Counseled on EtOH cessation  Will benefit from drug rehab on discharge  Psych consulted on admission    #Tobacco abuse  Continue NRT    #Elevated LFTs attributed to alcoholic hepatitis  GI consulted on  "admission  Adrián DF score low will hold off on steroids  Trend LFTs    VTE Prophylaxis:  Mechanical VTE prophylaxis orders are present.         Code status is   Code Status and Medical Interventions: CPR (Attempt to Resuscitate); Full Support   Ordered at: 07/30/24 2000     Code Status (Patient has no pulse and is not breathing):    CPR (Attempt to Resuscitate)     Medical Interventions (Patient has pulse or is breathing):    Full Support       Plan for disposition: 1 to 2 days    Time: 30 minutes    Signature: Electronically signed by Jj Harris MD, 07/31/24, 13:22 EDT.  [facility] Hospitalist Team      Electronically signed by Jj Harris MD at 07/31/24 1328          Consult Notes (all)        Karrie Valenzuela MD at 07/31/24 1413        Consult Orders    1. Inpatient Psychiatrist Consult [[id number]] ordered by Nara Del Rosario APRN at 07/30/24 2001                   Referring Provider: Nara Del Rosario  Reason for Consultation: alc withdrawal      Chief complaint alc withdrawal    Subjective .     History of present illness:  The patient is a 33 y.o. male who was admitted secondary to abd pain. PMHx: alc abuse, hx of opioid dependence, + hep C.   Psych consult was requested by Nara ACUÑA 2ry to alc withdrawal.  The pt reported extensive history of alcohol dependence, history of abuse with dependence with last use 5 years ago.  The patient also stated she has been diagnosed with bipolar disorder, he used to be on medications in the past but not recently.  He started drinking 10+ years ago in order to alleviate symptoms of depression, eventually progressed to daily drinking.  He was in rehab few years ago in [facility] for opioid dependence, he is staying abstinent from opioids, but he substituted opioids for alcohol.  The patient stated he has been \"functional alcoholic \", did not affect significantly his job performance, did not affect his family life.  " The patient reported increased tolerance, withdrawal symptoms, every morning he experiences tremor, severe abdominal discomfort, starts drinking at lunch, consumes up to 1/5 of liquor daily.  He stated he has been thinking about quitting alcohol for quite some time, but decided to do this time secondary to deterioration of his physical health.  Patient denied any perceptual disturbances, he denied suicidal and homicidal ideations  Past psychiatric history: Bipolar disorder, alcohol dependence, opioid dependence, no history of suicides, but he had fleeting suicidal ideations in the past        Review of Systems   All systems were reviewed and negative except for:  Constitution:  positive for fatigue  Gastrointestinal: positive for  nausea and pain  Neurological: positive for  dizziness and tremors  Behavioral/Psych: positive for  anxiety and depression    History  Past Medical History:   Diagnosis Date    Substance abuse         History reviewed. No pertinent family history.     Social History     Tobacco Use    Smoking status: Every Day     Current packs/day: 1.50     Average packs/day: 1.5 packs/day for 15.0 years (22.5 ttl pk-yrs)     Types: Cigarettes    Smokeless tobacco: Never   Vaping Use    Vaping status: Never Used   Substance Use Topics    Alcohol use: Yes     Alcohol/week: 20.0 standard drinks of alcohol     Types: 20 Drinks containing 0.5 oz of alcohol per week     Comment: every day, last drink 7-29    Drug use: Not Currently     Types: Heroin     Comment: last time, 5 years ago          No medications prior to admission.        Scheduled Meds:  folic acid, 1 mg, Oral, Daily  iopamidol, 100 mL, Intravenous, Once in imaging  multivitamin with minerals, 1 tablet, Oral, Daily  nicotine, 1 patch, Transdermal, Q24H  pantoprazole, 40 mg, Oral, Q AM  thiamine (B-1) IV, 200 mg, Intravenous, Q8H   Followed by  [START ON 8/5/2024] thiamine, 100 mg, Oral, Daily         Continuous Infusions:  sodium chloride, 100  "mL/hr, Last Rate: 100 mL/hr (07/30/24 2128)        PRN Meds:    labetalol    LORazepam **OR** LORazepam **OR** LORazepam **OR** LORazepam **OR** LORazepam **OR** LORazepam    Magnesium Standard Dose Replacement - Follow Nurse / BPA Driven Protocol    ondansetron ODT **OR** ondansetron    [COMPLETED] Insert Peripheral IV **AND** sodium chloride    traMADol      Allergies:  Patient has no known allergies.      Objective     Vital Signs   /84 (BP Location: Left arm, Patient Position: Lying)   Pulse 92   Temp 98.1 °F (36.7 °C) (Oral)   Resp 20   Ht 177.8 cm (70\")   Wt 77.3 kg (170 lb 6.7 oz)   SpO2 98%   BMI 24.45 kg/m²     Physical Exam:    Musculoskeletal:   Muscle strength and tone: WNL  Abnormal Movements: none   Gait: unable to assess, the pt was in the wheelchair readyu to be transported to his room      General Appearance:    In NAD         Mental Status Exam:   Hygiene:   good  Cooperation:  Cooperative  Eye Contact:  Good  Behavior and Psychomotor Activity: Appropriate  Speech:  Normal  Mood: depressed anxious   Affect:  Blunted and Congruent  Thought Process:  Goal directed, Linear, and Organized  Associations: Intact   Thought Content:   Mood congruent  Language: Appropriate  Suicidal Ideations:  None  Homicidal:  None  Hallucinations:  None  Delusion:  None  Orientation:  To person, Place, Time, and Situation  Memory:  Intact  Concentration and computation: Fair  Attention span: Fair  Fund of knowledge: Appropriate  Reliability:  fair  Insight:  Fair  Judgement:   Limited  Impulse Control:  Poor      Medications and allergies reviewed      Lab Results   Component Value Date    GLUCOSE 113 (H) 07/30/2024    CALCIUM 9.0 07/30/2024     (L) 07/30/2024    K 4.2 07/30/2024    CO2 20.2 (L) 07/30/2024    CL 97 (L) 07/30/2024    BUN 9 07/30/2024    CREATININE 0.76 07/30/2024    BCR 11.8 07/30/2024    ANIONGAP 16.8 (H) 07/30/2024       Last Urine Toxicity          Latest Ref Rng & Units 5/8/2023 " "  LAST URINE TOXICITY RESULTS   Barbiturates Screen, Urine Negative Negative    Benzodiazepine Screen, Urine Negative Negative    Cocaine Screen, Urine Negative Negative    Methadone Screen , Urine Negative Negative       Details                   No results found for: \"PHENYTOIN\", \"PHENOBARB\", \"VALPROATE\", \"CBMZ\"    Lab Results   Component Value Date     (L) 07/30/2024    BUN 9 07/30/2024    CREATININE 0.76 07/30/2024    WBC 6.59 07/30/2024       Brief Urine Lab Results  (Last result in the past 365 days)        Color   Clarity   Blood   Leuk Est   Nitrite   Protein   CREAT   Urine HCG        07/30/24 1621 Yellow   Cloudy   Negative   Negative   Negative   30 mg/dL (1+)               7/30/24 EKG     Assessment & Plan       Abdominal pain    Alcohol withdrawal syndrome without complication    Hyperbilirubinemia    Tobacco use          Assessment: Alcohol use disorder, alcohol withdrawal, bipolar disorder type II  Treatment Plan: The patient presented with the symptoms of alcohol dependence, alcohol withdrawal.  Remote history of opioid dependence but abstinent for 5 years.  Continue alcohol withdrawal protocol with lorazepam.  Cont thiamine and folic acid  Inpt CD rehab was offered, the pt was not ready to make decision yet   Cont to provide support, will follow   Treatment Plan discussed with: Patient and nursing     I discussed the patients findings and my recommendations with patient and nursing staff    I have reviewed and approved the behavioral health treatment plans and problem list. Yes  Thank you for the consult   Referring MD has access to consult report and progress notes in EMR       This document has been electronically signed by Karrie Valenzuela MD  July 31, 2024 14:13 EDT    Part of this note may be an electronic transcription/translation of spoken language to printed text using the Dragon Dictation System.        Electronically signed by Karrie Valenzuela MD at 07/31/24 1424   " "    Kat Schaffer APRN at 07/31/24 1005        Consult Orders    1. Inpatient Gastroenterology Consult [[id number]] ordered by Nara Del Rosario APRN at 07/30/24 2003              Attestation signed by Dale Garcia MD at 07/31/24 8685    The patient was seen and examined with gastroenterology advanced practice provider, Kat Schaffer. I personally performed the substantive portion of the history of presenting illness.  I also performed the physical exam and the medical decision making.    33-year-old male alcoholic with a history of illicit drug use drinking 1/5 of vodka daily with history of withdrawal presenting with alcohol intoxication and markedly elevated AST ALT.  Says he had COVID-19 2 weeks ago.  He had some vomiting and fatigue.  CT reviewed by me shows fatty liver with gallbladder intact.  On exam his abdomen is soft and nontender.  He is not jaundiced  Hep C antibody reactive, INR 1.0, , , TB 1.0, WBC 6.5, Hgb 14, , ceruloplasmin 14, ferritin 2000, iron 206  Impression:  Acute hepatitis hepatocellular pattern likely secondary to alcohol but serologic workup also suggests iron overload and concern for Richard's disease  Alcohol withdrawal  Hepatitis C antibody positive  Plan:  Check HCVRNA and genotype  CIWA protocol including thiamine  Hemochromatosis gene mutation  24-hour urine for copper  Encourage nutrition  Recommend alcohol rehab    Electronically signed by Dale Garcia MD, 07/31/24, 6:20 PM EDT.                       GI CONSULT  NOTE:    Referring Provider:  Dr. Harris    Chief complaint: Abdominal pain, alcohol abuse, elevated LFTs    Subjective . \"I was so weak\"    History of present illness: Jose Luis Elizabeth is a 33 y.o. male who has a history of alcohol and drug abuse.  He reports a history of IV drug use/snorting drugs but not recently.  He drinks 1/5 of vodka daily and reports symptoms of withdrawal if he does not drink routinely.  " He presents with nausea and vomiting, no hematemesis or melena.  He denies any current abdominal pain.  Occasional constipation or diarrhea but typically has regular bowel habits.  Appetite better today but has been down recently as he reports recent COVID.  He denies any family history of liver disease.  No previous blood transfusions.  He does have tattoos but denies exposure to hepatitis.  He does report history of elevated LFTs that he attributes to alcohol abuse.    Endo History:  No previous EGD or colonoscopy    Past Medical History:  Past Medical History:   Diagnosis Date    Substance abuse        Past Surgical History:  History reviewed. No pertinent surgical history.    Social History:  Social History     Tobacco Use    Smoking status: Every Day     Current packs/day: 1.50     Average packs/day: 1.5 packs/day for 15.0 years (22.5 ttl pk-yrs)     Types: Cigarettes    Smokeless tobacco: Never   Vaping Use    Vaping status: Never Used   Substance Use Topics    Alcohol use: Yes     Alcohol/week: 20.0 standard drinks of alcohol     Types: 20 Drinks containing 0.5 oz of alcohol per week     Comment: every day, last drink 7-29    Drug use: Not Currently     Types: Heroin     Comment: last time, 5 years ago       Family History:  Denies family history of liver disease    Medications:  (Not in a hospital admission)      Scheduled Meds:folic acid, 1 mg, Oral, Daily  iopamidol, 100 mL, Intravenous, Once in imaging  multivitamin with minerals, 1 tablet, Oral, Daily  nicotine, 1 patch, Transdermal, Q24H  pantoprazole, 40 mg, Oral, Q AM  thiamine (B-1) IV, 200 mg, Intravenous, Q8H   Followed by  [START ON 8/5/2024] thiamine, 100 mg, Oral, Daily      Continuous Infusions:sodium chloride, 100 mL/hr, Last Rate: 100 mL/hr (07/30/24 2128)      PRN Meds:.  labetalol    LORazepam **OR** LORazepam **OR** LORazepam **OR** LORazepam **OR** LORazepam **OR** LORazepam    Magnesium Standard Dose Replacement - Follow Nurse / BPA  Driven Protocol    ondansetron ODT **OR** ondansetron    [COMPLETED] Insert Peripheral IV **AND** sodium chloride    traMADol    ALLERGIES:  Patient has no known allergies.    ROS:  The following systems were reviewed   Constitution:  No fevers, chills, no unintentional weight loss  Skin: no rash, no jaundice  Eyes:  No blurry vision, no eye pain  HENT:  No change in hearing or smell  Resp:  No dyspnea or cough  CV:  No chest pain or palpitations  :  No dysuria, hematuria  Musculoskeletal:  No leg cramps or arthralgias  Neuro:  No tremor, no numbness  Psych:  No depression or confusion    Objective resting in bed, no acute distress    Vital Signs:   Vitals:    07/31/24 0130 07/31/24 0300 07/31/24 0500 07/31/24 0816   BP: 123/85 123/84 129/84    BP Location: Left arm  Left arm    Patient Position: Lying  Lying    Pulse: 107 102 92    Resp: 18  20 13   Temp: 98.8 °F (37.1 °C)  99.4 °F (37.4 °C) 98 °F (36.7 °C)   TempSrc: Oral  Oral Oral   SpO2: 94%  98%    Weight:   77.3 kg (170 lb 6.7 oz)    Height:           Physical Exam:     General Appearance:    Awake and alert, in no acute distress   Head:    Normocephalic, without obvious abnormality, atraumatic   Throat:   No oral lesions, no thrush, oral mucosa moist   Lungs:     Respirations regular, even and unlabored   Chest Wall:    No abnormalities observed   Abdomen:     Soft, non-tender, nondistended   Rectal:     Deferred   Extremities:   Moves all extremities, no cyanosis       Skin:   No rash, no jaundice, normal palpation       Neurologic:   Cranial nerves 2 - 12 grossly intact       Results Review:   I reviewed the patient's labs and imaging.  CBC    Results from last 7 days   Lab Units 07/30/24 2359 07/30/24  1602   WBC 10*3/mm3 6.59 10.25   HEMOGLOBIN g/dL 14.7 17.2   PLATELETS 10*3/mm3 155 203     CMP   Results from last 7 days   Lab Units 07/30/24 2359 07/30/24 2005 07/30/24  1602   SODIUM mmol/L 134*  --  137   POTASSIUM mmol/L 4.2  --  4.5   CHLORIDE  "mmol/L 97*  --  91*   CO2 mmol/L 20.2*  --  12.6*   BUN mg/dL 9  --  11   CREATININE mg/dL 0.76  --  0.94   GLUCOSE mg/dL 113*  --  57*   ALBUMIN g/dL 4.0  --  4.8   BILIRUBIN mg/dL 1.0  --  2.6*   ALK PHOS U/L 110  --  143*   AST (SGOT) U/L 594*  --  1,175*   ALT (SGPT) U/L 315*  --  382*   MAGNESIUM mg/dL 1.6  --  1.7   LIPASE U/L  --   --  41   AMMONIA umol/L  --  26  --      Cr Clearance Estimated Creatinine Clearance: 152.6 mL/min (by C-G formula based on SCr of 0.76 mg/dL).  Coag     HbA1C No results found for: \"HGBA1C\"  Blood Glucose   Glucose   Date/Time Value Ref Range Status   07/30/2024 2002 69 (L) 70 - 105 mg/dL Final     Comment:     Serial Number: [id number]:  [id number]     Infection     UA    Results from last 7 days   Lab Units 07/30/24  1621   NITRITE UA  Negative   WBC UA /HPF 0-2   BACTERIA UA /HPF None Seen   SQUAM EPITHEL UA /HPF 0-2     Imaging Results (Last 72 Hours)       Procedure Component Value Units Date/Time    CT Abdomen Pelvis With Contrast [[id number]] Collected: 07/30/24 1809     Updated: 07/30/24 1820    Narrative:      CT ABDOMEN PELVIS W CONTRAST    Date of Exam: 7/30/2024 5:19 PM EDT    Indication: abd pain/vomiting/elvated lfts.    Comparison: CT abdomen/pelvis 5/8/2023.    Technique: Axial CT images were obtained of the abdomen and pelvis following the uneventful intravenous administration of iodinated contrast. Sagittal and coronal reconstructions were performed.  Automated exposure control and iterative reconstruction   methods were used.        Findings:  Lung Bases: No significant abnormality.    Liver: Hepatomegaly measuring 19 mm. Hepatic steatosis.     Gallbladder and biliary tree: No significant abnormality.     Spleen: No significant abnormality.     Pancreas: No significant abnormality.     Adrenal glands: No significant abnormality.     Kidneys and ureters: No significant abnormality.     Stomach and duodenum:No significant abnormality.    Small and large " bowel: No evidence of obstruction. Normal appendix.    Peritoneal cavity: No free fluid or free air.    Bladder: No significant abnormality.     Pelvic organs: No significant abnormality.     Vasculature: No significant abnormality.     Lymph nodes: No pathologic appearing lymph nodes by imaging criteria.     Bones and soft tissues: No significant abnormality.      Impression:      Impression:  No acute findings in the abdomen/pelvis.    Hepatic steatosis with mild hepatomegaly.            Electronically Signed: Cholo ASUNCION Mariano    7/30/2024 6:18 PM EDT    Workstation ID: [id number]            ASSESSMENT:  Elevated LFTs -likely alcohol hepatitis  Alcohol abuse  History of drug abuse    PLAN:  Patient is a 33-year-old male with a history of drug abuse and daily alcohol abuse who presents with increased weakness and nausea with vomiting.  He is overall feeling better today.  He does report a history of withdrawal symptoms.    EtOH level 0.209 upon admission.  LFTs improving, , , alk phos 110 and total bilirubin 1.0.  Proceed with full liver serology workup including INR to assess Maddrey DF.  Alcohol withdrawal precautions.  Complete alcohol cessation encouraged.  Consult case management for outpatient resources.  Diet as tolerated and good nutrition encouraged.  Continue supportive care.    I discussed the patients findings and my recommendations with the patient.    We appreciate the referral    Electronically signed by DOMENICO Gilman, 07/31/24, 10:05 AM EDT.               Electronically signed by Dale Garcia MD at 07/31/24 0574

## 2024-08-01 NOTE — CASE MANAGEMENT/SOCIAL WORK
"Social Work Assessment  [facility]     Patient Name: Jose Luis Elizabeth  MRN: [id number]  Today's Date: 8/1/2024    Admit Date: 7/30/2024       Substance Abuse       Row Name 08/01/24 1510       Substance Use    Substance Use Status current tobacco use;current alcohol use    Last Tobacco Use Date 07/30/24    Environment Typically Uses Tobacco private residence    Reported Characteristics of Tobacco Use continue despite physical/psychological problems    Readiness to Change Tobacco Use precontemplation    Longest Period Tobacco-Free unknown    Last Alcohol Use 07/30/24    Environment Typically Uses Alcohol private residence;bar/restaurant;alone    Reported Characteristics of Alcohol Use continue despite physical/psychological problems    Readiness to Change Alcohol Use precontemplation    Attempts to Quit Alcohol none    Longest Period of Alcohol Sobriety Unknown.  Has not used heroin for 5 years.  He did not go to treatment.    Alcohol Withdrawal Pattern agitation;difficulty concentrating;tremor(s)    Previous Substance Use Treatment none    Substance Use Comment Patient reported he smokes 1 pack of cigarettes daily and is not ready to give up this habit.  He has been prescribed a nicotene patch during this hospital stay.  Patient stopped using heroin 5 years ago without help-'I just made up my mind to stop and I did\".  Until 2 days ago, patient was drinking 1/5 of vodka daily.  He is going to stopped drinking because \"I have made up mind to do so\".  Patient attends  Support Group via telephone.  Patient reported he started drinking at the age of 17.  Patient is employed [employer]  He has insurance through his employer.  Patient able to afford medication, food, housing and utilities.  Patient does not have a PCP and wants to set up his own appointment.  [facility]  contact 3 given to pt.  Patient is not interested in Substance Abuse Tx and feels he can stop drinking now that " he has made up his mind to stop. Patient agreeable to M2B.  No DME used or needed.  Patient's friend/Carley will transport pt home at time of discharge.  His Mother and Grandmother are support systems in his life.       AUDIT-C (Alcohol Use Disorders ID Test)    Q1: How often do you have a drink containing alcohol? 4 or more ti    Q2: How many drinks containing alcohol do you have on a typical day when you are drinking? 5 or 6    Q3: How often do you have six or more drinks on one occasion? Daily    Audit-C Score 10               QAMAR Aviles MSW    Phone: [phone]  Cell: [phone]  Fax: [phone]  [email or web address]

## 2024-08-02 VITALS
WEIGHT: 182.32 LBS | HEART RATE: 101 BPM | SYSTOLIC BLOOD PRESSURE: 147 MMHG | HEIGHT: 70 IN | TEMPERATURE: 97.8 F | OXYGEN SATURATION: 98 % | BODY MASS INDEX: 26.1 KG/M2 | RESPIRATION RATE: 18 BRPM | DIASTOLIC BLOOD PRESSURE: 100 MMHG

## 2024-08-02 PROBLEM — E80.6 HYPERBILIRUBINEMIA: Status: RESOLVED | Noted: 2024-07-30 | Resolved: 2024-08-02

## 2024-08-02 PROBLEM — R10.9 ABDOMINAL PAIN: Status: RESOLVED | Noted: 2024-07-30 | Resolved: 2024-08-02

## 2024-08-02 PROBLEM — F10.930 ALCOHOL WITHDRAWAL SYNDROME WITHOUT COMPLICATION: Status: RESOLVED | Noted: 2024-07-30 | Resolved: 2024-08-02

## 2024-08-02 LAB
ALBUMIN SERPL-MCNC: 4.2 G/DL (ref 3.5–5.2)
ALBUMIN/GLOB SERPL: 1.8 G/DL
ALP SERPL-CCNC: 108 U/L (ref 39–117)
ALT SERPL W P-5'-P-CCNC: 186 U/L (ref 1–41)
ANA SER QL: NEGATIVE
ANION GAP SERPL CALCULATED.3IONS-SCNC: 11.8 MMOL/L (ref 5–15)
AST SERPL-CCNC: 127 U/L (ref 1–40)
BASOPHILS # BLD AUTO: 0.02 10*3/MM3 (ref 0–0.2)
BASOPHILS NFR BLD AUTO: 0.4 % (ref 0–1.5)
BILIRUB SERPL-MCNC: 1.2 MG/DL (ref 0–1.2)
BUN SERPL-MCNC: 8 MG/DL (ref 6–20)
BUN/CREAT SERPL: 10.7 (ref 7–25)
CALCIUM SPEC-SCNC: 9.4 MG/DL (ref 8.6–10.5)
CHLORIDE SERPL-SCNC: 101 MMOL/L (ref 98–107)
CO2 SERPL-SCNC: 25.2 MMOL/L (ref 22–29)
CREAT SERPL-MCNC: 0.75 MG/DL (ref 0.76–1.27)
DEPRECATED RDW RBC AUTO: 39.6 FL (ref 37–54)
EGFRCR SERPLBLD CKD-EPI 2021: 123 ML/MIN/1.73
EOSINOPHIL # BLD AUTO: 0.04 10*3/MM3 (ref 0–0.4)
EOSINOPHIL NFR BLD AUTO: 0.8 % (ref 0.3–6.2)
ERYTHROCYTE [DISTWIDTH] IN BLOOD BY AUTOMATED COUNT: 11 % (ref 12.3–15.4)
GLOBULIN UR ELPH-MCNC: 2.3 GM/DL
GLUCOSE SERPL-MCNC: 76 MG/DL (ref 65–99)
HCT VFR BLD AUTO: 42.9 % (ref 37.5–51)
HGB BLD-MCNC: 15.2 G/DL (ref 13–17.7)
IMM GRANULOCYTES # BLD AUTO: 0.01 10*3/MM3 (ref 0–0.05)
IMM GRANULOCYTES NFR BLD AUTO: 0.2 % (ref 0–0.5)
LYMPHOCYTES # BLD AUTO: 1.29 10*3/MM3 (ref 0.7–3.1)
LYMPHOCYTES NFR BLD AUTO: 26.7 % (ref 19.6–45.3)
MAGNESIUM SERPL-MCNC: 1.9 MG/DL (ref 1.6–2.6)
MCH RBC QN AUTO: 34.4 PG (ref 26.6–33)
MCHC RBC AUTO-ENTMCNC: 35.4 G/DL (ref 31.5–35.7)
MCV RBC AUTO: 97.1 FL (ref 79–97)
MONOCYTES # BLD AUTO: 0.49 10*3/MM3 (ref 0.1–0.9)
MONOCYTES NFR BLD AUTO: 10.1 % (ref 5–12)
NEUTROPHILS NFR BLD AUTO: 2.99 10*3/MM3 (ref 1.7–7)
NEUTROPHILS NFR BLD AUTO: 61.8 % (ref 42.7–76)
NRBC BLD AUTO-RTO: 0 /100 WBC (ref 0–0.2)
PHOSPHATE SERPL-MCNC: 2.8 MG/DL (ref 2.5–4.5)
PLATELET # BLD AUTO: 147 10*3/MM3 (ref 140–450)
PMV BLD AUTO: 10.1 FL (ref 6–12)
POTASSIUM SERPL-SCNC: 3.6 MMOL/L (ref 3.5–5.2)
PROT SERPL-MCNC: 6.5 G/DL (ref 6–8.5)
RBC # BLD AUTO: 4.42 10*6/MM3 (ref 4.14–5.8)
SODIUM SERPL-SCNC: 138 MMOL/L (ref 136–145)
WBC NRBC COR # BLD AUTO: 4.84 10*3/MM3 (ref 3.4–10.8)

## 2024-08-02 PROCEDURE — 25010000002 THIAMINE PER 100 MG: Performed by: NURSE PRACTITIONER

## 2024-08-02 PROCEDURE — 80053 COMPREHEN METABOLIC PANEL: CPT | Performed by: HOSPITALIST

## 2024-08-02 PROCEDURE — 83735 ASSAY OF MAGNESIUM: CPT | Performed by: HOSPITALIST

## 2024-08-02 PROCEDURE — 84100 ASSAY OF PHOSPHORUS: CPT | Performed by: HOSPITALIST

## 2024-08-02 PROCEDURE — 85025 COMPLETE CBC W/AUTO DIFF WBC: CPT | Performed by: HOSPITALIST

## 2024-08-02 RX ORDER — GAUZE BANDAGE 2" X 2"
100 BANDAGE TOPICAL DAILY
Qty: 30 TABLET | Refills: 0 | Status: SHIPPED | OUTPATIENT
Start: 2024-08-05 | End: 2024-09-04

## 2024-08-02 RX ORDER — MULTIPLE VITAMINS W/ MINERALS TAB 9MG-400MCG
1 TAB ORAL DAILY
Qty: 30 TABLET | Refills: 0 | Status: SHIPPED | OUTPATIENT
Start: 2024-08-03 | End: 2024-09-02

## 2024-08-02 RX ORDER — FAMOTIDINE 40 MG/1
40 TABLET, FILM COATED ORAL NIGHTLY
Qty: 30 TABLET | Refills: 0 | Status: SHIPPED | OUTPATIENT
Start: 2024-08-02 | End: 2024-09-01

## 2024-08-02 RX ORDER — FOLIC ACID 1 MG/1
1 TABLET ORAL DAILY
Qty: 30 TABLET | Refills: 0 | Status: SHIPPED | OUTPATIENT
Start: 2024-08-03 | End: 2024-09-02

## 2024-08-02 RX ORDER — NICOTINE 21 MG/24HR
1 PATCH, TRANSDERMAL 24 HOURS TRANSDERMAL
Qty: 14 PATCH | Refills: 0 | Status: SHIPPED | OUTPATIENT
Start: 2024-08-03

## 2024-08-02 RX ADMIN — Medication 1 TABLET: at 08:36

## 2024-08-02 RX ADMIN — THIAMINE HYDROCHLORIDE 200 MG: 100 INJECTION, SOLUTION INTRAMUSCULAR; INTRAVENOUS at 06:17

## 2024-08-02 RX ADMIN — NICOTINE 1 PATCH: 21 PATCH TRANSDERMAL at 08:37

## 2024-08-02 RX ADMIN — FOLIC ACID 1 MG: 1 TABLET ORAL at 08:36

## 2024-08-02 RX ADMIN — PANTOPRAZOLE SODIUM 40 MG: 40 TABLET, DELAYED RELEASE ORAL at 06:17

## 2024-08-02 RX ADMIN — TRAMADOL HYDROCHLORIDE 50 MG: 50 TABLET ORAL at 08:39

## 2024-08-02 NOTE — PLAN OF CARE
Problem: Pain Acute  Goal: Acceptable Pain Control and Functional Ability  Intervention: Prevent or Manage Pain  Recent Flowsheet Documentation  Taken 8/1/2024 1951 by Callum Lujan, RN  Medication Review/Management: medications reviewed  Intervention: Optimize Psychosocial Wellbeing  Recent Flowsheet Documentation  Taken 8/1/2024 1951 by Callum Lujan, RN  Supportive Measures: active listening utilized  Diversional Activities: television   Goal Outcome Evaluation:        Patient has been calm and cooperative with care. Patient is anticipating to be discharged today.

## 2024-08-02 NOTE — PAYOR COMM NOTE
"This is discharge notification for Jose Luis Ryan   Reference/Auth # [id number]   Pt discharged routine to home on 8/2/24.    RUTHIE Duong, RN, CCM  Utilization Review Nurse  [facility]  Direct & confidential phone # [phone]  Fax # [phone]      Jose Luis Ryan (33 y.o. Male)       Date of Birth   1990    Social Security Number       Address   [address] [place] [address] [place] [address]    Home Phone   [phone]    MRN   [id number]       Rastafari   Jew    Marital Status                               Admission Date   7/30/24    Admission Type   Emergency    Admitting Provider   Jj Harris MD    Attending Provider       Department, Room/Bed   [facility] 2D, 270/1       Discharge Date   8/2/2024    Discharge Disposition   Home or Self Care    Discharge Destination                                 Attending Provider: (none)   Allergies: No Known Allergies    Isolation: None   Infection: None   Code Status: CPR    Ht: 177.8 cm (70\")   Wt: 82.7 kg (182 lb 5.1 oz)    Admission Cmt: None   Principal Problem: Abdominal pain [R10.9]                   Active Insurance as of 7/30/2024       Primary Coverage       Payor Plan Insurance Group Employer/Plan Group    [facility] BLUE CROSS [facility] [employer] [employer] BLUE SHIELD PPO [id number]       Payor Plan Address Payor Plan Phone Number Payor Plan Fax Number Effective Dates    [address] [phone]  5/8/2023 - None Entered    [place] [address]         Subscriber Name Subscriber Birth Date Member ID       JOSE LUIS RYAN 1990 [id number]                     Emergency Contacts        (Rel.) Home Phone Work Phone Mobile Phone    ROBI GONZALEZ (Mother) -- -- [phone]                 Discharge Summary        Brandon Morales MD at 08/02/24 [facility]                       [facility] Medicine Services  Discharge Summary    Date of Service: 08/02/24    Patient Name: Jose Luis PENDLETON " Raheem Elizabeth  : 1990  MRN: [id number]    Date of Admission: 2024  Discharge Diagnosis: EtOH intoxication and withdrawal syndrome  Date of Discharge:  24    Primary Care Physician: Provider, No Known      Presenting Problem:   Alcohol abuse [F10.10]  Abdominal pain [R10.9]  Elevated LFTs [R79.89]  Upper abdominal pain [R10.10]  Nausea and vomiting, unspecified vomiting type [R11.2]    Active and Resolved Hospital Problems:  #EtOH intoxication  #Alcohol withdrawal syndrome-improved  #Tobacco abuse  #Alcoholic hepatitis-improving  # on workup for hematochromatosis    Hospital Course     HPI:  Per the H&P  see HPI      Hospital Course:  Jose Luis Elizabeth is a 33 y.o. male with a CMH of alcohol abuse reports 1/5 of liquor daily, tobacco abuse, former IV drug use positive for hep C in past who presented to [facility] on 2024 with complaints of upper abdominal pain nausea and vomiting.  Was put on alcohol withdrawal protocol significantly improved.  GI team was consulted hide pending workup for hematochromatosis and hep C.  Patient discharged with a plan to follow-up with his PCP and GI as outpatient        DISCHARGE Follow Up Recommendations for labs and diagnostics:   PCP and CF-rgkvut-fo of lab results that were already collected and sent out      Reasons For Change In Medications and Indications for New Medications:      Day of Discharge     Vital Signs:  Temp:  [97.6 °F (36.4 °C)-98 °F (36.7 °C)] 97.8 °F (36.6 °C)  Heart Rate:  [] 101  Resp:  [9-21] 18  BP: (107-148)/() 147/100    Physical Exam:  Physical Exam   HENT:      Head: Atraumatic.      Mouth/Throat:      Mouth: Mucous membranes are moist.   Eyes:      Pupils: Pupils are equal, round, and reactive to light.   Cardiovascular:      Rate and Rhythm: Normal rate and regular rhythm.   Pulmonary:      Effort: Pulmonary effort is normal.      Breath sounds: Normal breath sounds.   Abdominal:      General: Bowel  sounds are normal.      Palpations: Abdomen is soft.   Musculoskeletal:         General: Normal range of motion.      Cervical back: Neck supple.   Skin:     General: Skin is warm.   Neurological:      General: No focal deficit present.      Mental Status: He is alert and oriented to person, place, and time.   Psychiatric:         Mood and Affect: Mood normal.     Pertinent  and/or Most Recent Results     LAB RESULTS:      Lab 08/02/24 0340 08/01/24 0213 07/31/24  0955 07/30/24 2359 07/30/24  1602   WBC 4.84 4.79  --  6.59 10.25   HEMOGLOBIN 15.2 13.9  --  14.7 17.2   HEMATOCRIT 42.9 39.0  --  42.3 50.3   PLATELETS 147 137*  --  155 203   NEUTROS ABS 2.99 3.12  --  4.40 8.73*   IMMATURE GRANS (ABS) 0.01 0.00  --  0.02 0.05   LYMPHS ABS 1.29 1.15  --  1.44 0.64*   MONOS ABS 0.49 0.46  --  0.68 0.76   EOS ABS 0.04 0.04  --  0.02 0.03   MCV 97.1* 97.5*  --  98.4* 100.8*   PROTIME  --   --  11.6  --   --          Lab 08/02/24 0340 08/01/24 0213 07/30/24 2359 07/30/24  1602   SODIUM 138 136 134* 137   POTASSIUM 3.6 4.0 4.2 4.5   CHLORIDE 101 98 97* 91*   CO2 25.2 26.9 20.2* 12.6*   ANION GAP 11.8 11.1 16.8* 33.4*   BUN 8 11 9 11   CREATININE 0.75* 0.78 0.76 0.94   EGFR 123.0 121.5 122.5 110.5   GLUCOSE 76 76 113* 57*   CALCIUM 9.4 8.8 9.0 9.3   MAGNESIUM 1.9 1.8 1.6 1.7   PHOSPHORUS 2.8 2.6  --   --          Lab 08/02/24 0340 08/01/24 0213 07/30/24 2359 07/30/24  1602   TOTAL PROTEIN 6.5 5.7* 6.0 7.4   ALBUMIN 4.2 3.9 4.0 4.8   GLOBULIN 2.3 1.8 2.0 2.6   ALT (SGPT) 186* 212* 315* 382*   AST (SGOT) 127* 200* 594* 1,175*   BILIRUBIN 1.2 1.4* 1.0 2.6*   ALK PHOS 108 95 110 143*   LIPASE  --   --   --  41         Lab 07/31/24  0955   PROTIME 11.6   INR 1.07             Lab 08/01/24  0213 07/30/24  2359   IRON 178* 206*   IRON SATURATION (TSAT) 68*  --    TIBC 261*  --    TRANSFERRIN 175*  --    FERRITIN  --  3,310.00*         Brief Urine Lab Results  (Last result in the past 365 days)        Color   Clarity   Blood    Leuk Est   Nitrite   Protein   CREAT   Urine HCG        07/30/24 1621 Yellow   Cloudy   Negative   Negative   Negative   30 mg/dL (1+)                 Microbiology Results (last 10 days)       ** No results found for the last 240 hours. **            CT Abdomen Pelvis With Contrast    Result Date: 7/30/2024  Impression: Impression: No acute findings in the abdomen/pelvis. Hepatic steatosis with mild hepatomegaly. Electronically Signed: Cholo Quintana  7/30/2024 6:18 PM EDT  Workstation ID: [id number]                 Labs Pending at Discharge:  Pending Labs       Order Current Status    FRANCESCO In process    Copper, Urine - Urine, Clean Catch In process    HCV RNA By PCR, Qn Rfx Myriam In process    Hemochromatosis Mutation In process            Procedures Performed           Consults:   Consults       Date and Time Order Name Status Description    7/30/2024  8:03 PM Inpatient Gastroenterology Consult Completed     7/30/2024  8:01 PM Inpatient Psychiatrist Consult Completed     7/30/2024  7:35 PM Hospitalist (on-call MD unless specified)                Discharge Details        Discharge Medications        New Medications        Instructions Start Date   folic acid 1 MG tablet  Commonly known as: FOLVITE   1 mg, Oral, Daily   Start Date: August 3, 2024     multivitamin with minerals tablet tablet   1 tablet, Oral, Daily   Start Date: August 3, 2024     nicotine 21 MG/24HR patch  Commonly known as: NICODERM CQ   1 patch, Transdermal, Every 24 Hours Scheduled   Start Date: August 3, 2024     thiamine 100 MG tablet  Commonly known as: VITAMIN B1   100 mg, Oral, Daily   Start Date: August 5, 2024              No Known Allergies      Discharge Disposition:   Home or Self Care    Diet:  Hospital:  Diet Order   Procedures    Diet: Regular/House; Fluid Consistency: Thin (IDDSI 0)         Discharge Activity:   as tolerated       CODE STATUS:  Code Status and Medical Interventions: CPR (Attempt to Resuscitate); Full Support   Ordered  at: 07/30/24 2000     Code Status (Patient has no pulse and is not breathing):    CPR (Attempt to Resuscitate)     Medical Interventions (Patient has pulse or is breathing):    Full Support         No future appointments.        Time spent on Discharge including face to face service:  >30 minutes    Signature: Electronically signed by Brandon Morales MD, 08/02/24, 10:40 EDT.  [facility]     Electronically signed by Brandon Morales MD at 08/02/24 1044

## 2024-08-02 NOTE — DISCHARGE SUMMARY
[facility] Medicine Services  Discharge Summary    Date of Service: 24    Patient Name: Jose Luis Elizabeth  : 1990  MRN: [id number]    Date of Admission: 2024  Discharge Diagnosis: EtOH intoxication and withdrawal syndrome  Date of Discharge:  24    Primary Care Physician: Provider, No Known      Presenting Problem:   Alcohol abuse [F10.10]  Abdominal pain [R10.9]  Elevated LFTs [R79.89]  Upper abdominal pain [R10.10]  Nausea and vomiting, unspecified vomiting type [R11.2]    Active and Resolved Hospital Problems:  #EtOH intoxication  #Alcohol withdrawal syndrome-improved  #Tobacco abuse  #Alcoholic hepatitis-improving  # on workup for hematochromatosis    Hospital Course     HPI:  Per the H&P  see HPI      Hospital Course:  Jose Luis Elizabeth is a 33 y.o. male with a CMH of alcohol abuse reports 1/5 of liquor daily, tobacco abuse, former IV drug use positive for hep C in past who presented to [facility] on 2024 with complaints of upper abdominal pain nausea and vomiting.  Was put on alcohol withdrawal protocol significantly improved.  GI team was consulted hide pending workup for hematochromatosis and hep C.  Patient discharged with a plan to follow-up with his PCP and GI as outpatient        DISCHARGE Follow Up Recommendations for labs and diagnostics:   PCP and [id number]-xtfntl-iw of lab results that were already collected and sent out      Reasons For Change In Medications and Indications for New Medications:      Day of Discharge     Vital Signs:  Temp:  [97.6 °F (36.4 °C)-98 °F (36.7 °C)] 97.8 °F (36.6 °C)  Heart Rate:  [] 101  Resp:  [9-21] 18  BP: (107-148)/() 147/100    Physical Exam:  Physical Exam   HENT:      Head: Atraumatic.      Mouth/Throat:      Mouth: Mucous membranes are moist.   Eyes:      Pupils: Pupils are equal, round, and reactive to light.   Cardiovascular:      Rate and Rhythm: Normal rate and regular rhythm.    Pulmonary:      Effort: Pulmonary effort is normal.      Breath sounds: Normal breath sounds.   Abdominal:      General: Bowel sounds are normal.      Palpations: Abdomen is soft.   Musculoskeletal:         General: Normal range of motion.      Cervical back: Neck supple.   Skin:     General: Skin is warm.   Neurological:      General: No focal deficit present.      Mental Status: He is alert and oriented to person, place, and time.   Psychiatric:         Mood and Affect: Mood normal.     Pertinent  and/or Most Recent Results     LAB RESULTS:      Lab 08/02/24 0340 08/01/24 0213 07/31/24  0955 07/30/24 2359 07/30/24  1602   WBC 4.84 4.79  --  6.59 10.25   HEMOGLOBIN 15.2 13.9  --  14.7 17.2   HEMATOCRIT 42.9 39.0  --  42.3 50.3   PLATELETS 147 137*  --  155 203   NEUTROS ABS 2.99 3.12  --  4.40 8.73*   IMMATURE GRANS (ABS) 0.01 0.00  --  0.02 0.05   LYMPHS ABS 1.29 1.15  --  1.44 0.64*   MONOS ABS 0.49 0.46  --  0.68 0.76   EOS ABS 0.04 0.04  --  0.02 0.03   MCV 97.1* 97.5*  --  98.4* 100.8*   PROTIME  --   --  11.6  --   --          Lab 08/02/24 0340 08/01/24 0213 07/30/24 2359 07/30/24  1602   SODIUM 138 136 134* 137   POTASSIUM 3.6 4.0 4.2 4.5   CHLORIDE 101 98 97* 91*   CO2 25.2 26.9 20.2* 12.6*   ANION GAP 11.8 11.1 16.8* 33.4*   BUN 8 11 9 11   CREATININE 0.75* 0.78 0.76 0.94   EGFR 123.0 121.5 122.5 110.5   GLUCOSE 76 76 113* 57*   CALCIUM 9.4 8.8 9.0 9.3   MAGNESIUM 1.9 1.8 1.6 1.7   PHOSPHORUS 2.8 2.6  --   --          Lab 08/02/24  0340 08/01/24 0213 07/30/24 2359 07/30/24  1602   TOTAL PROTEIN 6.5 5.7* 6.0 7.4   ALBUMIN 4.2 3.9 4.0 4.8   GLOBULIN 2.3 1.8 2.0 2.6   ALT (SGPT) 186* 212* 315* 382*   AST (SGOT) 127* 200* 594* 1,175*   BILIRUBIN 1.2 1.4* 1.0 2.6*   ALK PHOS 108 95 110 143*   LIPASE  --   --   --  41         Lab 07/31/24  0955   PROTIME 11.6   INR 1.07             Lab 08/01/24  0213 07/30/24  2359   IRON 178* 206*   IRON SATURATION (TSAT) 68*  --    TIBC 261*  --    TRANSFERRIN 175*   --    FERRITIN  --  3,310.00*         Brief Urine Lab Results  (Last result in the past 365 days)        Color   Clarity   Blood   Leuk Est   Nitrite   Protein   CREAT   Urine HCG        07/30/24 1621 Yellow   Cloudy   Negative   Negative   Negative   30 mg/dL (1+)                 Microbiology Results (last 10 days)       ** No results found for the last 240 hours. **            CT Abdomen Pelvis With Contrast    Result Date: 7/30/2024  Impression: Impression: No acute findings in the abdomen/pelvis. Hepatic steatosis with mild hepatomegaly. Electronically Signed: Cholo Quintana  7/30/2024 6:18 PM EDT  Workstation ID: [id number]                 Labs Pending at Discharge:  Pending Labs       Order Current Status    FRANCESCO In process    Copper, Urine - Urine, Clean Catch In process    HCV RNA By PCR, Qn Rfx Myriam In process    Hemochromatosis Mutation In process            Procedures Performed           Consults:   Consults       Date and Time Order Name Status Description    7/30/2024  8:03 PM Inpatient Gastroenterology Consult Completed     7/30/2024  8:01 PM Inpatient Psychiatrist Consult Completed     7/30/2024  7:35 PM Hospitalist (on-call MD unless specified)                Discharge Details        Discharge Medications        New Medications        Instructions Start Date   folic acid 1 MG tablet  Commonly known as: FOLVITE   1 mg, Oral, Daily   Start Date: August 3, 2024     multivitamin with minerals tablet tablet   1 tablet, Oral, Daily   Start Date: August 3, 2024     nicotine 21 MG/24HR patch  Commonly known as: NICODERM CQ   1 patch, Transdermal, Every 24 Hours Scheduled   Start Date: August 3, 2024     thiamine 100 MG tablet  Commonly known as: VITAMIN B1   100 mg, Oral, Daily   Start Date: August 5, 2024              No Known Allergies      Discharge Disposition:   Home or Self Care    Diet:  Hospital:  Diet Order   Procedures    Diet: Regular/House; Fluid Consistency: Thin (IDDSI 0)         Discharge  Activity:   as tolerated       CODE STATUS:  Code Status and Medical Interventions: CPR (Attempt to Resuscitate); Full Support   Ordered at: 07/30/24 2000     Code Status (Patient has no pulse and is not breathing):    CPR (Attempt to Resuscitate)     Medical Interventions (Patient has pulse or is breathing):    Full Support         No future appointments.        Time spent on Discharge including face to face service:  >30 minutes    Signature: Electronically signed by Brandon Morales MD, 08/02/24, 10:40 EDT.  [facility] [place]

## 2024-08-02 NOTE — DISCHARGE INSTRUCTIONS
Please follow-up with gastroenterology team as outpatient for your pending test have not been resulted

## 2024-08-05 LAB
COPPER 24H UR-MRATE: 13 UG/24 HR (ref 3–35)
COPPER UR-MCNC: 5 UG/L
COPPER/CREAT UR: 11 UG/G CREAT (ref 0–49)
CREAT UR-MCNC: 0.44 G/L (ref 0.3–3)
HCV GENTYP SERPL NAA+PROBE: NORMAL
HCV GENTYP SERPL NAA+PROBE: NORMAL
HCV RNA SERPL NAA+PROBE-ACNC: NORMAL IU/ML
HCV RNA SERPL NAA+PROBE-LOG IU: 4.22 LOG10 IU/ML
LABORATORY COMMENT REPORT: NORMAL

## 2024-08-06 LAB
HFE GENE MUT ANL BLD/T: NORMAL
IMP & REVIEW OF LAB RESULTS: NORMAL